# Patient Record
Sex: FEMALE | Race: WHITE
[De-identification: names, ages, dates, MRNs, and addresses within clinical notes are randomized per-mention and may not be internally consistent; named-entity substitution may affect disease eponyms.]

---

## 2017-03-08 ENCOUNTER — APPOINTMENT (OUTPATIENT)
Dept: HEMATOLOGY ONCOLOGY | Facility: CLINIC | Age: 57
End: 2017-03-08

## 2017-03-08 ENCOUNTER — OUTPATIENT (OUTPATIENT)
Dept: OUTPATIENT SERVICES | Facility: HOSPITAL | Age: 57
LOS: 1 days | Discharge: HOME | End: 2017-03-08

## 2017-03-08 VITALS
HEIGHT: 61 IN | RESPIRATION RATE: 15 BRPM | TEMPERATURE: 97.7 F | HEART RATE: 108 BPM | SYSTOLIC BLOOD PRESSURE: 137 MMHG | WEIGHT: 185 LBS | DIASTOLIC BLOOD PRESSURE: 83 MMHG | BODY MASS INDEX: 34.93 KG/M2

## 2017-03-08 DIAGNOSIS — C50.919 MALIGNANT NEOPLASM OF UNSPECIFIED SITE OF UNSPECIFIED FEMALE BREAST: ICD-10-CM

## 2017-03-08 DIAGNOSIS — N23 UNSPECIFIED RENAL COLIC: ICD-10-CM

## 2017-03-08 DIAGNOSIS — M19.90 UNSPECIFIED OSTEOARTHRITIS, UNSPECIFIED SITE: ICD-10-CM

## 2017-03-20 LAB
ALBUMIN SERPL-MCNC: 4.6 G/DL
ALBUMIN/GLOB SERPL: 1.77
ALP SERPL-CCNC: 84 IU/L
ALT SERPL-CCNC: 59 IU/L
ANION GAP SERPL CALC-SCNC: 12 MEQ/L
AST SERPL-CCNC: 28 IU/L
BASOPHILS # BLD: 0.05 TH/MM3
BASOPHILS NFR BLD: 0.5 %
BILIRUB SERPL-MCNC: 0.6 MG/DL
BUN SERPL-MCNC: 16 MG/DL
BUN/CREAT SERPL: 16.2 %
CALCIUM SERPL-MCNC: 10.2 MG/DL
CANCER AG15-3 SERPL-ACNC: 13 U/ML
CANCER AG27-29 SERPL-ACNC: 18.4 U/ML
CEA SERPL-MCNC: 0.8 NG/ML
CHLORIDE SERPL-SCNC: 102 MEQ/L
CO2 SERPL-SCNC: 26 MEQ/L
CREAT SERPL-MCNC: 0.99 MG/DL
EOSINOPHIL # BLD: 0.16 TH/MM3
EOSINOPHIL NFR BLD: 1.7 %
ERYTHROCYTE [DISTWIDTH] IN BLOOD BY AUTOMATED COUNT: 13 %
GFR SERPL CREATININE-BSD FRML MDRD: 58
GLUCOSE SERPL-MCNC: 77 MG/DL
GRANULOCYTES # BLD: 5.05 TH/MM3
GRANULOCYTES NFR BLD: 55.1 %
HCT VFR BLD AUTO: 40.7 %
HGB BLD-MCNC: 13.9 G/DL
IMM GRANULOCYTES # BLD: 0.02 TH/MM3
IMM GRANULOCYTES NFR BLD: 0.2 %
LYMPHOCYTES # BLD: 3.2 TH/MM3
LYMPHOCYTES NFR BLD: 34.9 %
MCH RBC QN AUTO: 30.3 PG
MCHC RBC AUTO-ENTMCNC: 34.2 G/DL
MCV RBC AUTO: 88.9 FL
MONOCYTES # BLD: 0.7 TH/MM3
MONOCYTES NFR BLD: 7.6 %
PLATELET # BLD: 312 TH/MM3
PMV BLD AUTO: 9.3 FL
POTASSIUM SERPL-SCNC: 4.4 MMOL/L
PROT SERPL-MCNC: 7.2 G/DL
RBC # BLD AUTO: 4.58 MIL/MM3
SODIUM SERPL-SCNC: 140 MEQ/L
WBC # BLD: 9.18 TH/MM3

## 2017-04-10 ENCOUNTER — APPOINTMENT (OUTPATIENT)
Dept: UROLOGY | Facility: CLINIC | Age: 57
End: 2017-04-10

## 2017-04-11 ENCOUNTER — OUTPATIENT (OUTPATIENT)
Dept: OUTPATIENT SERVICES | Facility: HOSPITAL | Age: 57
LOS: 1 days | Discharge: HOME | End: 2017-04-11

## 2017-05-16 ENCOUNTER — APPOINTMENT (OUTPATIENT)
Dept: BREAST CENTER | Facility: CLINIC | Age: 57
End: 2017-05-16

## 2017-05-16 VITALS
HEIGHT: 61 IN | DIASTOLIC BLOOD PRESSURE: 82 MMHG | SYSTOLIC BLOOD PRESSURE: 124 MMHG | WEIGHT: 185 LBS | BODY MASS INDEX: 34.93 KG/M2

## 2017-05-16 DIAGNOSIS — Z80.42 FAMILY HISTORY OF MALIGNANT NEOPLASM OF PROSTATE: ICD-10-CM

## 2017-05-16 DIAGNOSIS — Z87.891 PERSONAL HISTORY OF NICOTINE DEPENDENCE: ICD-10-CM

## 2017-05-16 DIAGNOSIS — Z80.1 FAMILY HISTORY OF MALIGNANT NEOPLASM OF TRACHEA, BRONCHUS AND LUNG: ICD-10-CM

## 2017-05-18 ENCOUNTER — RX RENEWAL (OUTPATIENT)
Age: 57
End: 2017-05-18

## 2017-06-27 DIAGNOSIS — C50.919 MALIGNANT NEOPLASM OF UNSPECIFIED SITE OF UNSPECIFIED FEMALE BREAST: ICD-10-CM

## 2017-07-31 ENCOUNTER — OUTPATIENT (OUTPATIENT)
Dept: OUTPATIENT SERVICES | Facility: HOSPITAL | Age: 57
LOS: 1 days | Discharge: HOME | End: 2017-07-31

## 2017-07-31 DIAGNOSIS — Z12.31 ENCOUNTER FOR SCREENING MAMMOGRAM FOR MALIGNANT NEOPLASM OF BREAST: ICD-10-CM

## 2017-07-31 DIAGNOSIS — C50.919 MALIGNANT NEOPLASM OF UNSPECIFIED SITE OF UNSPECIFIED FEMALE BREAST: ICD-10-CM

## 2017-07-31 DIAGNOSIS — N23 UNSPECIFIED RENAL COLIC: ICD-10-CM

## 2017-07-31 DIAGNOSIS — M19.90 UNSPECIFIED OSTEOARTHRITIS, UNSPECIFIED SITE: ICD-10-CM

## 2017-08-07 ENCOUNTER — TRANSCRIPTION ENCOUNTER (OUTPATIENT)
Age: 57
End: 2017-08-07

## 2017-09-13 ENCOUNTER — APPOINTMENT (OUTPATIENT)
Dept: HEMATOLOGY ONCOLOGY | Facility: CLINIC | Age: 57
End: 2017-09-13

## 2017-09-13 ENCOUNTER — OUTPATIENT (OUTPATIENT)
Dept: OUTPATIENT SERVICES | Facility: HOSPITAL | Age: 57
LOS: 1 days | Discharge: HOME | End: 2017-09-13

## 2017-09-13 VITALS
SYSTOLIC BLOOD PRESSURE: 128 MMHG | TEMPERATURE: 97.3 F | HEIGHT: 61 IN | RESPIRATION RATE: 14 BRPM | WEIGHT: 185 LBS | HEART RATE: 98 BPM | DIASTOLIC BLOOD PRESSURE: 84 MMHG | BODY MASS INDEX: 34.93 KG/M2

## 2017-09-14 DIAGNOSIS — C50.811 MALIGNANT NEOPLASM OF OVERLAPPING SITES OF RIGHT FEMALE BREAST: ICD-10-CM

## 2017-10-05 ENCOUNTER — APPOINTMENT (OUTPATIENT)
Dept: BREAST CENTER | Facility: CLINIC | Age: 57
End: 2017-10-05
Payer: COMMERCIAL

## 2017-10-05 VITALS
SYSTOLIC BLOOD PRESSURE: 122 MMHG | BODY MASS INDEX: 34.93 KG/M2 | HEART RATE: 95 BPM | HEIGHT: 61 IN | DIASTOLIC BLOOD PRESSURE: 82 MMHG | OXYGEN SATURATION: 96 % | WEIGHT: 185 LBS

## 2017-10-05 PROCEDURE — 99213 OFFICE O/P EST LOW 20 MIN: CPT

## 2017-12-07 ENCOUNTER — OUTPATIENT (OUTPATIENT)
Dept: OUTPATIENT SERVICES | Facility: HOSPITAL | Age: 57
LOS: 1 days | Discharge: HOME | End: 2017-12-07

## 2017-12-07 DIAGNOSIS — C50.919 MALIGNANT NEOPLASM OF UNSPECIFIED SITE OF UNSPECIFIED FEMALE BREAST: ICD-10-CM

## 2017-12-07 DIAGNOSIS — N23 UNSPECIFIED RENAL COLIC: ICD-10-CM

## 2017-12-07 DIAGNOSIS — N20.0 CALCULUS OF KIDNEY: ICD-10-CM

## 2017-12-07 DIAGNOSIS — M19.90 UNSPECIFIED OSTEOARTHRITIS, UNSPECIFIED SITE: ICD-10-CM

## 2018-01-02 ENCOUNTER — APPOINTMENT (OUTPATIENT)
Dept: UROLOGY | Facility: CLINIC | Age: 58
End: 2018-01-02
Payer: COMMERCIAL

## 2018-01-02 VITALS
WEIGHT: 185 LBS | BODY MASS INDEX: 34.93 KG/M2 | HEIGHT: 61 IN | SYSTOLIC BLOOD PRESSURE: 149 MMHG | DIASTOLIC BLOOD PRESSURE: 89 MMHG | HEART RATE: 79 BPM

## 2018-01-02 PROCEDURE — 99214 OFFICE O/P EST MOD 30 MIN: CPT

## 2018-01-11 ENCOUNTER — OUTPATIENT (OUTPATIENT)
Dept: OUTPATIENT SERVICES | Facility: HOSPITAL | Age: 58
LOS: 1 days | Discharge: HOME | End: 2018-01-11

## 2018-01-11 DIAGNOSIS — N23 UNSPECIFIED RENAL COLIC: ICD-10-CM

## 2018-01-11 DIAGNOSIS — C50.919 MALIGNANT NEOPLASM OF UNSPECIFIED SITE OF UNSPECIFIED FEMALE BREAST: ICD-10-CM

## 2018-01-11 DIAGNOSIS — Z01.818 ENCOUNTER FOR OTHER PREPROCEDURAL EXAMINATION: ICD-10-CM

## 2018-01-11 DIAGNOSIS — N20.2 CALCULUS OF KIDNEY WITH CALCULUS OF URETER: ICD-10-CM

## 2018-01-11 DIAGNOSIS — M19.90 UNSPECIFIED OSTEOARTHRITIS, UNSPECIFIED SITE: ICD-10-CM

## 2018-01-17 ENCOUNTER — OUTPATIENT (OUTPATIENT)
Dept: OUTPATIENT SERVICES | Facility: HOSPITAL | Age: 58
LOS: 1 days | Discharge: HOME | End: 2018-01-17

## 2018-01-17 ENCOUNTER — APPOINTMENT (OUTPATIENT)
Dept: UROLOGY | Facility: HOSPITAL | Age: 58
End: 2018-01-17
Payer: COMMERCIAL

## 2018-01-17 DIAGNOSIS — C50.919 MALIGNANT NEOPLASM OF UNSPECIFIED SITE OF UNSPECIFIED FEMALE BREAST: ICD-10-CM

## 2018-01-17 DIAGNOSIS — N23 UNSPECIFIED RENAL COLIC: ICD-10-CM

## 2018-01-17 DIAGNOSIS — M19.90 UNSPECIFIED OSTEOARTHRITIS, UNSPECIFIED SITE: ICD-10-CM

## 2018-01-17 PROCEDURE — 50590 FRAGMENTING OF KIDNEY STONE: CPT

## 2018-01-25 DIAGNOSIS — N20.0 CALCULUS OF KIDNEY: ICD-10-CM

## 2018-02-07 ENCOUNTER — OUTPATIENT (OUTPATIENT)
Dept: OUTPATIENT SERVICES | Facility: HOSPITAL | Age: 58
LOS: 1 days | Discharge: HOME | End: 2018-02-07

## 2018-02-07 DIAGNOSIS — N20.1 CALCULUS OF URETER: ICD-10-CM

## 2018-02-13 ENCOUNTER — LABORATORY RESULT (OUTPATIENT)
Age: 58
End: 2018-02-13

## 2018-02-13 ENCOUNTER — APPOINTMENT (OUTPATIENT)
Dept: UROLOGY | Facility: CLINIC | Age: 58
End: 2018-02-13
Payer: COMMERCIAL

## 2018-02-13 VITALS
WEIGHT: 185 LBS | SYSTOLIC BLOOD PRESSURE: 147 MMHG | BODY MASS INDEX: 34.93 KG/M2 | HEART RATE: 86 BPM | HEIGHT: 61 IN | DIASTOLIC BLOOD PRESSURE: 87 MMHG

## 2018-02-13 DIAGNOSIS — N20.0 CALCULUS OF KIDNEY: ICD-10-CM

## 2018-02-13 PROCEDURE — 99024 POSTOP FOLLOW-UP VISIT: CPT

## 2018-02-14 ENCOUNTER — RX RENEWAL (OUTPATIENT)
Age: 58
End: 2018-02-14

## 2018-03-29 ENCOUNTER — APPOINTMENT (OUTPATIENT)
Dept: HEMATOLOGY ONCOLOGY | Facility: CLINIC | Age: 58
End: 2018-03-29

## 2018-03-29 ENCOUNTER — LABORATORY RESULT (OUTPATIENT)
Age: 58
End: 2018-03-29

## 2018-03-29 VITALS
HEART RATE: 78 BPM | WEIGHT: 185 LBS | RESPIRATION RATE: 16 BRPM | SYSTOLIC BLOOD PRESSURE: 130 MMHG | DIASTOLIC BLOOD PRESSURE: 84 MMHG | BODY MASS INDEX: 34.93 KG/M2 | TEMPERATURE: 98 F | HEIGHT: 61 IN

## 2018-04-26 ENCOUNTER — APPOINTMENT (OUTPATIENT)
Dept: BREAST CENTER | Facility: CLINIC | Age: 58
End: 2018-04-26

## 2018-05-01 ENCOUNTER — OUTPATIENT (OUTPATIENT)
Dept: OUTPATIENT SERVICES | Facility: HOSPITAL | Age: 58
LOS: 1 days | Discharge: HOME | End: 2018-05-01

## 2018-05-01 DIAGNOSIS — C50.411 MALIGNANT NEOPLASM OF UPPER-OUTER QUADRANT OF RIGHT FEMALE BREAST: ICD-10-CM

## 2018-05-29 ENCOUNTER — APPOINTMENT (OUTPATIENT)
Dept: BREAST CENTER | Facility: CLINIC | Age: 58
End: 2018-05-29
Payer: COMMERCIAL

## 2018-05-29 VITALS
DIASTOLIC BLOOD PRESSURE: 82 MMHG | HEIGHT: 61 IN | SYSTOLIC BLOOD PRESSURE: 126 MMHG | BODY MASS INDEX: 34.93 KG/M2 | WEIGHT: 185 LBS | HEART RATE: 104 BPM | OXYGEN SATURATION: 97 %

## 2018-05-29 PROCEDURE — 99213 OFFICE O/P EST LOW 20 MIN: CPT

## 2018-06-27 ENCOUNTER — OUTPATIENT (OUTPATIENT)
Dept: OUTPATIENT SERVICES | Facility: HOSPITAL | Age: 58
LOS: 1 days | Discharge: HOME | End: 2018-06-27

## 2018-06-27 ENCOUNTER — APPOINTMENT (OUTPATIENT)
Dept: HEMATOLOGY ONCOLOGY | Facility: CLINIC | Age: 58
End: 2018-06-27

## 2018-06-27 VITALS
TEMPERATURE: 97 F | HEIGHT: 61 IN | WEIGHT: 185 LBS | RESPIRATION RATE: 16 BRPM | SYSTOLIC BLOOD PRESSURE: 144 MMHG | DIASTOLIC BLOOD PRESSURE: 86 MMHG | HEART RATE: 98 BPM | BODY MASS INDEX: 34.93 KG/M2

## 2018-06-27 DIAGNOSIS — C50.811 MALIGNANT NEOPLASM OF OVERLAPPING SITES OF RIGHT FEMALE BREAST: ICD-10-CM

## 2018-06-30 LAB
ALBUMIN SERPL ELPH-MCNC: 4.7 G/DL
ALP BLD-CCNC: 92 U/L
ALT SERPL-CCNC: 94 U/L
ANION GAP SERPL CALC-SCNC: 17 MMOL/L
AST SERPL-CCNC: 37 U/L
BILIRUB SERPL-MCNC: 0.4 MG/DL
BUN SERPL-MCNC: 20 MG/DL
CALCIUM SERPL-MCNC: 10.2 MG/DL
CANCER AG15-3 SERPL-ACNC: 12.7 U/ML
CEA SERPL-MCNC: 0.9 NG/ML
CHLORIDE SERPL-SCNC: 103 MMOL/L
CO2 SERPL-SCNC: 23 MMOL/L
CREAT SERPL-MCNC: 0.9 MG/DL
GLUCOSE SERPL-MCNC: 69 MG/DL
HCT VFR BLD CALC: 43.6 %
HGB BLD-MCNC: 14.6 G/DL
MCHC RBC-ENTMCNC: 29.7 PG
MCHC RBC-ENTMCNC: 33.5 G/DL
MCV RBC AUTO: 88.8 FL
PLATELET # BLD AUTO: 275 K/UL
PMV BLD: 9.4 FL
POTASSIUM SERPL-SCNC: 4.3 MMOL/L
PROT SERPL-MCNC: 7.2 G/DL
RBC # BLD: 4.91 M/UL
RBC # FLD: 12.3 %
SODIUM SERPL-SCNC: 143 MMOL/L
WBC # FLD AUTO: 9.46 K/UL

## 2018-07-31 ENCOUNTER — FORM ENCOUNTER (OUTPATIENT)
Age: 58
End: 2018-07-31

## 2018-08-01 ENCOUNTER — OUTPATIENT (OUTPATIENT)
Dept: OUTPATIENT SERVICES | Facility: HOSPITAL | Age: 58
LOS: 1 days | Discharge: HOME | End: 2018-08-01

## 2018-08-01 DIAGNOSIS — Z12.31 ENCOUNTER FOR SCREENING MAMMOGRAM FOR MALIGNANT NEOPLASM OF BREAST: ICD-10-CM

## 2018-08-02 DIAGNOSIS — Z78.0 ASYMPTOMATIC MENOPAUSAL STATE: ICD-10-CM

## 2018-08-02 DIAGNOSIS — Z13.820 ENCOUNTER FOR SCREENING FOR OSTEOPOROSIS: ICD-10-CM

## 2018-08-02 DIAGNOSIS — M89.9 DISORDER OF BONE, UNSPECIFIED: ICD-10-CM

## 2018-08-07 ENCOUNTER — APPOINTMENT (OUTPATIENT)
Dept: BREAST CENTER | Facility: CLINIC | Age: 58
End: 2018-08-07
Payer: COMMERCIAL

## 2018-08-07 VITALS
OXYGEN SATURATION: 96 % | HEART RATE: 94 BPM | WEIGHT: 185 LBS | BODY MASS INDEX: 34.93 KG/M2 | HEIGHT: 61 IN | SYSTOLIC BLOOD PRESSURE: 122 MMHG | DIASTOLIC BLOOD PRESSURE: 80 MMHG

## 2018-08-07 PROCEDURE — 99213 OFFICE O/P EST LOW 20 MIN: CPT

## 2018-08-14 ENCOUNTER — APPOINTMENT (OUTPATIENT)
Dept: UROLOGY | Facility: CLINIC | Age: 58
End: 2018-08-14

## 2018-08-31 ENCOUNTER — RX RENEWAL (OUTPATIENT)
Age: 58
End: 2018-08-31

## 2018-08-31 RX ORDER — ANASTROZOLE TABLETS 1 MG/1
1 TABLET ORAL DAILY
Qty: 90 | Refills: 2 | Status: ACTIVE | COMMUNITY
Start: 2017-05-18 | End: 1900-01-01

## 2018-09-26 ENCOUNTER — APPOINTMENT (OUTPATIENT)
Dept: HEMATOLOGY ONCOLOGY | Facility: CLINIC | Age: 58
End: 2018-09-26

## 2018-09-26 ENCOUNTER — OUTPATIENT (OUTPATIENT)
Dept: OUTPATIENT SERVICES | Facility: HOSPITAL | Age: 58
LOS: 1 days | Discharge: HOME | End: 2018-09-26

## 2018-09-26 VITALS
HEART RATE: 98 BPM | DIASTOLIC BLOOD PRESSURE: 77 MMHG | BODY MASS INDEX: 33.99 KG/M2 | HEIGHT: 61 IN | SYSTOLIC BLOOD PRESSURE: 130 MMHG | WEIGHT: 180 LBS | RESPIRATION RATE: 16 BRPM | TEMPERATURE: 97.6 F

## 2018-09-27 DIAGNOSIS — Z79.811 LONG TERM (CURRENT) USE OF AROMATASE INHIBITORS: ICD-10-CM

## 2018-09-27 DIAGNOSIS — C50.811 MALIGNANT NEOPLASM OF OVERLAPPING SITES OF RIGHT FEMALE BREAST: ICD-10-CM

## 2019-03-27 ENCOUNTER — LABORATORY RESULT (OUTPATIENT)
Age: 59
End: 2019-03-27

## 2019-03-27 ENCOUNTER — OUTPATIENT (OUTPATIENT)
Dept: OUTPATIENT SERVICES | Facility: HOSPITAL | Age: 59
LOS: 1 days | Discharge: HOME | End: 2019-03-27

## 2019-03-27 ENCOUNTER — APPOINTMENT (OUTPATIENT)
Dept: HEMATOLOGY ONCOLOGY | Facility: CLINIC | Age: 59
End: 2019-03-27

## 2019-03-27 VITALS
RESPIRATION RATE: 14 BRPM | SYSTOLIC BLOOD PRESSURE: 118 MMHG | BODY MASS INDEX: 34.17 KG/M2 | HEART RATE: 102 BPM | WEIGHT: 181 LBS | DIASTOLIC BLOOD PRESSURE: 92 MMHG | HEIGHT: 61 IN | TEMPERATURE: 98.6 F

## 2019-03-27 NOTE — HISTORY OF PRESENT ILLNESS
[de-identified] : A 57yearold female is here today for a followup visit.  She has a stage IIA pT2 N0M0  invasive ductal carcinoma of the right breast, ER/TX positive, HER2/ananth negative.  She had a right breast lumpectomy and axillary lymph node biopsy in 09/2012.  Her Oncotype recurrence score was 25.  She elected to have adjuvant chemotherapy.  She received Taxotere, cyclophosphamide for four cycles.  After that, she had received a course of adjuvant breast radiotherapy and finished in 03/2013.  She has been taking adjuvant endocrine therapy with anastrozole since 04/2013.  She developed muscular aching and was enrolled in SWOG phase III trial evaluating Duloxetine in women who developed musculoskeletal aching due to an aromatase inhibitor.  She took the medicine for three months and completed study.  \par \par She had last bone density done on 07/29/2016, which showed osteopenia in the lumbar spine.  She is taking calcium and vitamin D. She had Mammogram done on 07/31/2017  It showed stable calcified mass in the left breast for over 2 year.  She scheduled for b/l breast MRI 4/11/2018.  [de-identified] : Today,  she reports feeling well.  She is working full time as a .  She had her bone density done on 07/29/2016, which showed osteopenia in the lumbar spine.  She is taking calcium and vitamin D.\par \par On 5/2/18  she had B/l breast MRI with no suspicious finding .  She scheduled for b/l  breast mammogram on 8/1/18 and will be due for Bone density in 8/1/18 . \par \par The BCI test was done on her previous surgical specimen, which showed high risk of cancer recurrent from 5 to 10 at 6.1%. The study shows low likelihood benefit from extended endocrine therapy.\par  \par on 9/26/18 patient came for scheduled follow up visit, she reported feeling well. She still working as a full time teacher. We  re communicate the extend of the endocrine therapy. also we reviewed Bone density result reveal osteopenia in the Spin region. Patient reported she is more comfortable to continue Endocrine therapy for 2 more year. \par Bone Density: from 8/1/18 \par ----------------------------------------------------------------- \par Region BMD T-score Z-score Classification \par ----------------------------------------------------------------- \par AP Spine (L1-L4) 0.838 -1.9 -0.6 Osteopenia \par Femoral Neck (Left) 0.831 -0.2 1.0 Normal \par Total Hip (Left) 0.928 -0.1 0.7 Normal \par \par Patient had Mammogram on 8/1/18 \par -----------------------------------------------------------------\par No suspicious masses, calcifications or other abnormalities are seen. \par \par When compared to prior studies there is no significant change. \par \par IMPRESSION: \par There is no evidence of malignancy\par \par 3/27/19\par Patient is here today for follow up visit. She takes Anastrozole since 4/2013.  Although she c/o joint pains and weight gain, she prefers to complete 7 years of Anastrozole.  Her latest mammogram on 8/2018 showed no malignancy. Next MRI breasts due on 4/2019. Labs reviewed. Slightly elevated ALT =94.1, Ca=10.2.  Bone density 8/2018 showed Osteopenia.\par \par The BCI test was done on her previous surgical specimen, which showed high risk of cancer recurrent from 5 to 10 at 6.1%. The study shows low likelihood benefit from extended endocrine therapy.

## 2019-03-27 NOTE — PHYSICAL EXAM
[Fully active, able to carry on all pre-disease performance without restriction] : Status 0 - Fully active, able to carry on all pre-disease performance without restriction [Normal] : affect appropriate [de-identified] : Status post right breast Lumpectomy. There is no palpable abnormality in either breast and axilla.

## 2019-03-27 NOTE — ASSESSMENT
[FreeTextEntry1] : Stage IIA ER/ND positive, HER2/ananth negative invasive ductal carcinoma of the right breast, status post lumpectomy, sentinel lymph node biopsy, adjuvant chemotherapy, adjuvant breast therapy, radiotherapy, currently on adjuvant endocrine therapy.  There is no clinical evidence of recurrent disease.\par Oncotype RS 26.\par \par PLAN: \par -- Continue Anastrozole daily till 4/2020 (4 years total).  E-refilled today.\par -- Encourage more physical activities.\par -- MRI breasts appt 4/2019.\par -- Mammogram appt due 8/2019.  \par --Blood work ordered today:  CBC, CMP, CA 15-3, CEA.\par -- Followup with PCP for health maintenance.\par -- Followup in 3  months with Dr. Delaney.\par \par Case was seen and discussed with Dr. Delaney who agreed with the assessment and plan.\par

## 2019-03-27 NOTE — CONSULT LETTER
[Dear  ___] : Dear  [unfilled], [Courtesy Letter:] : I had the pleasure of seeing your patient, [unfilled], in my office today. [Please see my note below.] : Please see my note below. [Sincerely,] : Sincerely, [FreeTextEntry3] : Jade Delaney MD

## 2019-03-28 ENCOUNTER — TRANSCRIPTION ENCOUNTER (OUTPATIENT)
Age: 59
End: 2019-03-28

## 2019-03-29 DIAGNOSIS — Z79.811 LONG TERM (CURRENT) USE OF AROMATASE INHIBITORS: ICD-10-CM

## 2019-03-29 DIAGNOSIS — C50.811 MALIGNANT NEOPLASM OF OVERLAPPING SITES OF RIGHT FEMALE BREAST: ICD-10-CM

## 2019-04-11 LAB
ALBUMIN SERPL ELPH-MCNC: 4.8 G/DL
ALP BLD-CCNC: 97 U/L
ALT SERPL-CCNC: 54 U/L
ANION GAP SERPL CALC-SCNC: 17 MMOL/L
AST SERPL-CCNC: 26 U/L
BILIRUB SERPL-MCNC: 0.6 MG/DL
BUN SERPL-MCNC: 21 MG/DL
CALCIUM SERPL-MCNC: 10.5 MG/DL
CANCER AG15-3 SERPL-ACNC: 11.2 U/ML
CEA SERPL-MCNC: 0.6 NG/ML
CHLORIDE SERPL-SCNC: 102 MMOL/L
CO2 SERPL-SCNC: 23 MMOL/L
CREAT SERPL-MCNC: 1 MG/DL
GLUCOSE SERPL-MCNC: 88 MG/DL
HCT VFR BLD CALC: 40.7 %
HGB BLD-MCNC: 13.9 G/DL
MCHC RBC-ENTMCNC: 30.3 PG
MCHC RBC-ENTMCNC: 34.2 G/DL
MCV RBC AUTO: 88.7 FL
PLATELET # BLD AUTO: 264 K/UL
PMV BLD: 9.3 FL
POTASSIUM SERPL-SCNC: 4.2 MMOL/L
PROT SERPL-MCNC: 7.4 G/DL
RBC # BLD: 4.59 M/UL
RBC # FLD: 12.6 %
SODIUM SERPL-SCNC: 142 MMOL/L
WBC # FLD AUTO: 10.1 K/UL

## 2019-05-14 ENCOUNTER — FORM ENCOUNTER (OUTPATIENT)
Age: 59
End: 2019-05-14

## 2019-05-15 ENCOUNTER — OUTPATIENT (OUTPATIENT)
Dept: OUTPATIENT SERVICES | Facility: HOSPITAL | Age: 59
LOS: 1 days | Discharge: HOME | End: 2019-05-15
Payer: COMMERCIAL

## 2019-05-15 DIAGNOSIS — C50.411 MALIGNANT NEOPLASM OF UPPER-OUTER QUADRANT OF RIGHT FEMALE BREAST: ICD-10-CM

## 2019-05-15 PROCEDURE — 77049 MRI BREAST C-+ W/CAD BI: CPT | Mod: 26

## 2019-06-06 ENCOUNTER — APPOINTMENT (OUTPATIENT)
Dept: BREAST CENTER | Facility: CLINIC | Age: 59
End: 2019-06-06
Payer: COMMERCIAL

## 2019-06-06 VITALS
WEIGHT: 181 LBS | DIASTOLIC BLOOD PRESSURE: 78 MMHG | SYSTOLIC BLOOD PRESSURE: 112 MMHG | TEMPERATURE: 98.2 F | BODY MASS INDEX: 34.17 KG/M2 | HEIGHT: 61 IN

## 2019-06-06 PROCEDURE — 99213 OFFICE O/P EST LOW 20 MIN: CPT

## 2019-06-07 NOTE — REVIEW OF SYSTEMS
[Negative] : Constitutional [Breast Lump] : no breast lump [Breast Pain] : no breast pain [Nipple Discharge] : no nipple discharge [Nipple Inverted] : no inversion of the nipple

## 2019-06-07 NOTE — ASSESSMENT
[FreeTextEntry1] : MAI is a dana 58 year old patient who presented today in follow up for a history of right breast cancer. \par She has been doing well with no complaints. \par Imaging was done recently which revealed no MR evidence of malignancy; stable architectural distortion in the right breast upper-outer quadrant at prior lumpectomy site consistent with postsurgical change, as detailed above.\par Physical exam was unrevealing today.\par \par Imaging with bilateral screening mammogram will be due in August 2019, and that will be scheduled today. \par Some thought to further spreading out MRI / mammo imaging will be given for 2020.\par All questions answered. She knows to call with any concerns.\par She will return for follow-up and clinical breast exam in approximately six months.\par She will continue follow-up with medical oncology as well.\par \par MAI URIARTE has been enrolled in the breast cancer surgical survivorship care program.\par A copy of her survivorship care plan has been provided to her as well.

## 2019-06-07 NOTE — PHYSICAL EXAM
[Normocephalic] : normocephalic [Atraumatic] : atraumatic [No dominant masses] : no dominant masses in right breast  [No dominant masses] : no dominant masses left breast [No Nipple Discharge] : no left nipple discharge [No Rashes] : no rashes [No Ulceration] : no ulceration [Breast Nipple Inversion] : nipples not inverted [Breast Nipple Retraction] : nipples not retracted [de-identified] : No axillary lymphadenopathy appreciated. [de-identified] : well healed surgical scars. \par some palpable scar tissue.  [de-identified] : No axillary lymphadenopathy appreciated.

## 2019-06-07 NOTE — DATA REVIEWED
[FreeTextEntry1] : B/L Breast MRI - 05/15/19:\par FINDINGS: \par \par Amount of fibroglandular tissue: Scattered fibroglandular tissue \par \par Background parenchymal enhancement: Minimal, Symmetric \par \par RIGHT BREAST: \par No enhancing mass or suspicious area of enhancement is identified. \par \par Stable architectural distortion in the upper outer quadrant at prior \par lumpectomy site consistent with postsurgical change. Multiple biopsy clips \par from prior benign biopsies are again noted. \par \par Nipple and skin appear normal. \par \par No axillary adenopathy. \par \par LEFT BREAST: \par Multiple stable masses are again seen which have been unchanged for over 2 \par years and therefore benign. \par \par Otherwise no suspicious enhancing mass, architectural distortion, or \par suspicious area of enhancement is identified. \par \par Nipple and skin appear normal. \par \par No axillary adenopathy. \par \par OTHER: \par Imaged portions of the chest and abdomen demonstrate no appreciable \par abnormality. \par \par \par IMPRESSION: \par \par No MR evidence of malignancy. \par \par Stable architectural distortion in the right breast upper-outer quadrant at \par prior lumpectomy site consistent with postsurgical change. \par \par Recommendation: Unless otherwise indicated by clinical findings, annual \par screening mammography recommended. \par \par BI-RADS Category 2: Benign

## 2019-06-07 NOTE — PAST MEDICAL HISTORY
[Menarche Age ____] : age at menarche was [unfilled] [Total Preg ___] : G[unfilled] [Live Births ___] : P[unfilled]  [Age At Live Birth ___] : Age at live birth: [unfilled] [History of Hormone Replacement Treatment] : has no history of hormone replacement treatment [FreeTextEntry5] : none [FreeTextEntry6] : none [FreeTextEntry8] : none [FreeTextEntry7] : none

## 2019-08-19 ENCOUNTER — FORM ENCOUNTER (OUTPATIENT)
Age: 59
End: 2019-08-19

## 2019-08-20 ENCOUNTER — OUTPATIENT (OUTPATIENT)
Dept: OUTPATIENT SERVICES | Facility: HOSPITAL | Age: 59
LOS: 1 days | Discharge: HOME | End: 2019-08-20
Payer: COMMERCIAL

## 2019-08-20 DIAGNOSIS — Z85.3 PERSONAL HISTORY OF MALIGNANT NEOPLASM OF BREAST: ICD-10-CM

## 2019-08-20 DIAGNOSIS — Z12.31 ENCOUNTER FOR SCREENING MAMMOGRAM FOR MALIGNANT NEOPLASM OF BREAST: ICD-10-CM

## 2019-08-20 PROCEDURE — 77063 BREAST TOMOSYNTHESIS BI: CPT | Mod: 26

## 2019-08-20 PROCEDURE — 77067 SCR MAMMO BI INCL CAD: CPT | Mod: 26

## 2019-09-25 ENCOUNTER — LABORATORY RESULT (OUTPATIENT)
Age: 59
End: 2019-09-25

## 2019-09-25 ENCOUNTER — OUTPATIENT (OUTPATIENT)
Dept: OUTPATIENT SERVICES | Facility: HOSPITAL | Age: 59
LOS: 1 days | Discharge: HOME | End: 2019-09-25

## 2019-09-25 ENCOUNTER — APPOINTMENT (OUTPATIENT)
Dept: HEMATOLOGY ONCOLOGY | Facility: CLINIC | Age: 59
End: 2019-09-25
Payer: COMMERCIAL

## 2019-09-25 VITALS
WEIGHT: 178 LBS | SYSTOLIC BLOOD PRESSURE: 136 MMHG | TEMPERATURE: 97.3 F | BODY MASS INDEX: 33.63 KG/M2 | DIASTOLIC BLOOD PRESSURE: 60 MMHG | HEART RATE: 84 BPM

## 2019-09-25 PROCEDURE — 99214 OFFICE O/P EST MOD 30 MIN: CPT

## 2019-10-06 LAB
ALBUMIN SERPL ELPH-MCNC: 4.9 G/DL
ALP BLD-CCNC: 91 U/L
ALT SERPL-CCNC: 48 U/L
ANION GAP SERPL CALC-SCNC: 14 MMOL/L
AST SERPL-CCNC: 26 U/L
BILIRUB SERPL-MCNC: 0.5 MG/DL
BUN SERPL-MCNC: 17 MG/DL
CALCIUM SERPL-MCNC: 9.9 MG/DL
CANCER AG15-3 SERPL-ACNC: 10.9 U/ML
CEA SERPL-MCNC: 0.8 NG/ML
CHLORIDE SERPL-SCNC: 101 MMOL/L
CO2 SERPL-SCNC: 27 MMOL/L
CREAT SERPL-MCNC: 1 MG/DL
GLUCOSE SERPL-MCNC: 79 MG/DL
HCT VFR BLD CALC: 42.8 %
HGB BLD-MCNC: 13.9 G/DL
MCHC RBC-ENTMCNC: 29.4 PG
MCHC RBC-ENTMCNC: 32.5 G/DL
MCV RBC AUTO: 90.7 FL
PLATELET # BLD AUTO: 269 K/UL
PMV BLD: 9.4 FL
POTASSIUM SERPL-SCNC: 4 MMOL/L
PROT SERPL-MCNC: 7.6 G/DL
RBC # BLD: 4.72 M/UL
RBC # FLD: 12.4 %
SODIUM SERPL-SCNC: 142 MMOL/L
WBC # FLD AUTO: 10.25 K/UL

## 2019-10-06 NOTE — HISTORY OF PRESENT ILLNESS
[de-identified] : A 57yearold female is here today for a followup visit.  She has a stage IIA pT2 N0M0  invasive ductal carcinoma of the right breast, ER/SC positive, HER2/ananth negative.  She had a right breast lumpectomy and axillary lymph node biopsy in 09/2012.  Her Oncotype recurrence score was 25.  She elected to have adjuvant chemotherapy.  She received Taxotere, cyclophosphamide for four cycles.  After that, she had received a course of adjuvant breast radiotherapy and finished in 03/2013.  She has been taking adjuvant endocrine therapy with anastrozole since 04/2013.  She developed muscular aching and was enrolled in SWOG phase III trial evaluating Duloxetine in women who developed musculoskeletal aching due to an aromatase inhibitor.  She took the medicine for three months and completed study.  \par \par She had last bone density done on 07/29/2016, which showed osteopenia in the lumbar spine.  She is taking calcium and vitamin D. She had Mammogram done on 07/31/2017  It showed stable calcified mass in the left breast for over 2 year.  She scheduled for b/l breast MRI 4/11/2018.  [de-identified] : Today,  she reports feeling well.  She is working full time as a .  She had her bone density done on 07/29/2016, which showed osteopenia in the lumbar spine.  She is taking calcium and vitamin D.\par \par On 5/2/18  she had B/l breast MRI with no suspicious finding .  She scheduled for b/l  breast mammogram on 8/1/18 and will be due for Bone density in 8/1/18 . \par \par The BCI test was done on her previous surgical specimen, which showed high risk of cancer recurrent from 5 to 10 at 6.1%. The study shows low likelihood benefit from extended endocrine therapy.\par  \par on 9/26/18 patient came for scheduled follow up visit, she reported feeling well. She still working as a full time teacher. We  re communicate the extend of the endocrine therapy. also we reviewed Bone density result reveal osteopenia in the Spin region. Patient reported she is more comfortable to continue Endocrine therapy for 2 more year. \par Bone Density: from 8/1/18 \par ----------------------------------------------------------------- \par Region BMD T-score Z-score Classification \par ----------------------------------------------------------------- \par AP Spine (L1-L4) 0.838 -1.9 -0.6 Osteopenia \par Femoral Neck (Left) 0.831 -0.2 1.0 Normal \par Total Hip (Left) 0.928 -0.1 0.7 Normal \par \par Patient had Mammogram on 8/1/18 \par -----------------------------------------------------------------\par No suspicious masses, calcifications or other abnormalities are seen. \par \par When compared to prior studies there is no significant change. \par \par IMPRESSION: \par There is no evidence of malignancy\par \par 3/27/19\par Patient is here today for follow up visit. She takes Anastrozole since 4/2013.  Although she c/o joint pains and weight gain, she prefers to complete 7 years of Anastrozole.  Her latest mammogram on 8/2018 showed no malignancy. Next MRI breasts due on 4/2019. Labs reviewed. Slightly elevated ALT =94.1, Ca=10.2.  Bone density 8/2018 showed Osteopenia.\par \par The BCI test was done on her previous surgical specimen, which showed high risk of cancer recurrent from 5 to 10 at 6.1%. The study shows low likelihood benefit from extended endocrine therapy.\par \par 9/25/19\par Patient is here today for follow up visit. She has been taking Anastrozole since 4/2013. She has chronic arthralgia. She had b/l screening mammo in 8/2019. There was no suspicious finding. Bone density 8/2018 showed Osteopenia. She does not have breast related complains.\par

## 2019-10-06 NOTE — ASSESSMENT
[FreeTextEntry1] : Stage IIA ER/PA positive, HER2/ananth negative invasive ductal carcinoma of the right breast, status post lumpectomy, sentinel lymph node biopsy, adjuvant chemotherapy, adjuvant breast therapy, radiotherapy, currently on adjuvant endocrine therapy.  There is no clinical evidence of recurrent disease.\par Oncotype RS 26.\par \par PLAN: \par -- Continue Anastrozole daily, calium and vitamine D supplement.\par -- Encourage more physical activities.\par -- Annual MRI breasts and screening mammo alternatively.  \par -- Blood work ordered today:  CBC, CMP, CA 15-3, CEA.\par -- Followup with PCP for health maintenance.\par -- Followup in 3  months with Dr. Delaney.\par \par Case was seen and discussed with Dr. Delaney who agreed with the assessment and plan.\par

## 2019-10-06 NOTE — PHYSICAL EXAM
[Fully active, able to carry on all pre-disease performance without restriction] : Status 0 - Fully active, able to carry on all pre-disease performance without restriction [Normal] : affect appropriate [de-identified] : Status post right breast Lumpectomy. There is no palpable abnormality in either breast and axilla.

## 2019-10-11 DIAGNOSIS — D05.11 INTRADUCTAL CARCINOMA IN SITU OF RIGHT BREAST: ICD-10-CM

## 2019-10-11 DIAGNOSIS — C50.411 MALIGNANT NEOPLASM OF UPPER-OUTER QUADRANT OF RIGHT FEMALE BREAST: ICD-10-CM

## 2019-12-05 ENCOUNTER — APPOINTMENT (OUTPATIENT)
Dept: BREAST CENTER | Facility: CLINIC | Age: 59
End: 2019-12-05
Payer: COMMERCIAL

## 2019-12-05 VITALS
DIASTOLIC BLOOD PRESSURE: 80 MMHG | WEIGHT: 178 LBS | HEIGHT: 61 IN | BODY MASS INDEX: 33.61 KG/M2 | SYSTOLIC BLOOD PRESSURE: 132 MMHG | TEMPERATURE: 98.5 F

## 2019-12-05 PROCEDURE — 99213 OFFICE O/P EST LOW 20 MIN: CPT

## 2019-12-06 NOTE — HISTORY OF PRESENT ILLNESS
[FreeTextEntry1] : Patient with Right mod diff IDC/DCIS intermed nuclear grade on NC 7/30/12; 10:00 N4, 2.7 cm.  ER/[place] (+).\par Right FC changewith florid ductal hyperplasia, sclerosing adenosis and Ca++ on NC 7/30/12; \par 7-8:00 N3, 1.4 cm.\par Right hyalinized FA and focal acute mastitis on NC 7/30/12; 9:00 N1-2.\par Left complex cyst FNA 7/30/12, aspirated to resolution, 10:00 N3-4.\par s/p Right NLOC/SNB 9/12/12- 1/1 (+) ITC present on final, 2.1 cm mod diff IDC, non extensive DCIS intermed nuclear grade; (-) margins.  FA, Lymphovascular invasion.\par Onctype RS 25.  (+) Chemo - Dr. Delaney.  On Arimidex.  (+) whole breast RT - Dr. Padilla.\par \par MAI URIARTE is a 59 year old female patient who presents today in follow up for right breast cancer.\par Since her last visit, she has no new breast related complaints. \par Imaging was done on 08/20/19, which revealed no mammographic evidence of malignancy.. \par \par She presents today for evaluation and imaging review.

## 2019-12-06 NOTE — REVIEW OF SYSTEMS
[Negative] : Constitutional [Breast Pain] : no breast pain [Nipple Inverted] : no inversion of the nipple [Breast Lump] : no breast lump [Nipple Discharge] : no nipple discharge

## 2019-12-06 NOTE — ASSESSMENT
[FreeTextEntry1] : MAI is a dana 59 year old patient who presented today in follow up for a history of right breast cancer. \par She has been doing well with no complaints. \par Imaging was done recently which revealed no mammographic evidence of malignancy.\par \par Imaging with bilateral breast MRI will be due in May 2020, and that will be scheduled today. \par She will return for follow-up and clinical breast exam in approximately six months.\par She will continue follow-up with medical oncology as well.\par \par I spent a total of 15 minutes of face to face time with this patient, greater than 50% of which was spent in counseling and/or coordination of care.\par All of her questions were appropriately answered.\par She knows to call with any concerns.\par \par MAI URIARTE has been enrolled in the breast cancer surgical survivorship care program.\par A copy of her survivorship care plan has been provided to her as well.

## 2019-12-06 NOTE — DATA REVIEWED
[FreeTextEntry1] : B/L Screening Mammo - 08/20/19:\par MAMMOGRAM FINDINGS: \par Mammography was performed including the following views: bilateral craniocaudal with tomosynthesis, bilateral mediolateral oblique with tomosynthesis. The examination includes digital synthetic 2D and digital tomosynthesis 3D images. Additional imaging analysis was performed using CAD (computer-aided detection) software. \par There are scattered areas of fibroglandular density. \par There is an area of architectural distortion at the site of lumpectomy seen in the right breast. \par No suspicious mass, grouping of calcifications, or other abnormality is identified. \par IMPRESSION: \par There is no mammographic evidence of malignancy. \par RECOMMENDATION: \par Unless otherwise indicated by clinical findings, annual screening mammography recommended. \par ASSESSMENT: \par BI-RADS Category 2: Benign

## 2019-12-06 NOTE — PHYSICAL EXAM
[Atraumatic] : atraumatic [Normocephalic] : normocephalic [No Nipple Discharge] : no right nipple discharge [No dominant masses] : no dominant masses in right breast  [No dominant masses] : no dominant masses left breast [No Rashes] : no rashes [No Ulceration] : no ulceration [de-identified] : well healed surgical scars. \par some palpable scar tissue.  [Breast Nipple Inversion] : nipples not inverted [Breast Nipple Retraction] : nipples not retracted [de-identified] : No axillary lymphadenopathy appreciated. [de-identified] : No axillary lymphadenopathy appreciated.

## 2019-12-06 NOTE — PAST MEDICAL HISTORY
[Menarche Age ____] : age at menarche was [unfilled] [Total Preg ___] : G[unfilled] [Age At Live Birth ___] : Age at live birth: [unfilled] [Live Births ___] : P[unfilled]  [History of Hormone Replacement Treatment] : has no history of hormone replacement treatment [FreeTextEntry5] : none [FreeTextEntry6] : none [FreeTextEntry8] : none [FreeTextEntry7] : none

## 2019-12-16 ENCOUNTER — RX RENEWAL (OUTPATIENT)
Age: 59
End: 2019-12-16

## 2019-12-16 RX ORDER — ANASTROZOLE TABLETS 1 MG/1
1 TABLET ORAL DAILY
Qty: 90 | Refills: 1 | Status: ACTIVE | COMMUNITY
Start: 1900-01-01 | End: 1900-01-01

## 2020-02-17 ENCOUNTER — OUTPATIENT (OUTPATIENT)
Dept: OUTPATIENT SERVICES | Facility: HOSPITAL | Age: 60
LOS: 1 days | Discharge: HOME | End: 2020-02-17
Payer: COMMERCIAL

## 2020-02-17 DIAGNOSIS — R06.00 DYSPNEA, UNSPECIFIED: ICD-10-CM

## 2020-02-17 DIAGNOSIS — M54.2 CERVICALGIA: ICD-10-CM

## 2020-02-17 PROCEDURE — 71046 X-RAY EXAM CHEST 2 VIEWS: CPT | Mod: 26

## 2020-02-17 PROCEDURE — 72050 X-RAY EXAM NECK SPINE 4/5VWS: CPT | Mod: 26

## 2020-02-19 ENCOUNTER — APPOINTMENT (OUTPATIENT)
Dept: OBGYN | Facility: CLINIC | Age: 60
End: 2020-02-19
Payer: COMMERCIAL

## 2020-02-19 ENCOUNTER — ASOB RESULT (OUTPATIENT)
Age: 60
End: 2020-02-19

## 2020-02-19 VITALS
HEIGHT: 61 IN | SYSTOLIC BLOOD PRESSURE: 130 MMHG | HEART RATE: 79 BPM | BODY MASS INDEX: 34.93 KG/M2 | DIASTOLIC BLOOD PRESSURE: 81 MMHG | WEIGHT: 185 LBS

## 2020-02-19 DIAGNOSIS — N20.0 CALCULUS OF KIDNEY: ICD-10-CM

## 2020-02-19 DIAGNOSIS — Z78.9 OTHER SPECIFIED HEALTH STATUS: ICD-10-CM

## 2020-02-19 DIAGNOSIS — Z86.39 PERSONAL HISTORY OF OTHER ENDOCRINE, NUTRITIONAL AND METABOLIC DISEASE: ICD-10-CM

## 2020-02-19 DIAGNOSIS — Z78.0 ASYMPTOMATIC MENOPAUSAL STATE: ICD-10-CM

## 2020-02-19 PROCEDURE — 99396 PREV VISIT EST AGE 40-64: CPT

## 2020-02-19 PROCEDURE — 76857 US EXAM PELVIC LIMITED: CPT | Mod: 59

## 2020-02-19 PROCEDURE — 76830 TRANSVAGINAL US NON-OB: CPT

## 2020-02-19 NOTE — CHIEF COMPLAINT
[Annual Visit] : annual visit [FreeTextEntry1] : Patient is here for annual exam , up to date with PE , blood work , personal hx breast Ca  with Right breast lumpectomy 2012 on Anastrozole  under breast specialist dr Ybarra and dr VAZQUEZ oncology care ,patient denies pelvic pain ,vaginal bleeding

## 2020-02-19 NOTE — DISCUSSION/SUMMARY
[FreeTextEntry1] : Patient here for annual exam. No complaints. History of fibroid uterus and right breast lumpectomy.\par \par \par Chloe Gamino M.D.\par

## 2020-02-19 NOTE — PHYSICAL EXAM
[Awake] : awake [Mass] : no breast mass [Alert] : alert [Acute Distress] : no acute distress [Nipple Discharge] : no nipple discharge [Axillary LAD] : no axillary lymphadenopathy [Soft] : soft [Tender] : non tender [Oriented x3] : oriented to person, place, and time [Vulvar Atrophy] : vulvar atrophy [Atrophy] : atrophy [Normal] : uterus [Rectocele] : a rectocele [Cystocele] : a cystocele [Enlarged ___ wks] : enlarged [unfilled] ~Uweeks [No Bleeding] : there was no active vaginal bleeding [Uterine Adnexae] : were not tender and not enlarged [No Tenderness] : no rectal tenderness [External Hemorrhoid] : an external hemorrhoid

## 2020-02-21 LAB — HPV HIGH+LOW RISK DNA PNL CVX: NOT DETECTED

## 2020-05-18 ENCOUNTER — RESULT REVIEW (OUTPATIENT)
Age: 60
End: 2020-05-18

## 2020-05-18 ENCOUNTER — OUTPATIENT (OUTPATIENT)
Dept: OUTPATIENT SERVICES | Facility: HOSPITAL | Age: 60
LOS: 1 days | Discharge: HOME | End: 2020-05-18
Payer: COMMERCIAL

## 2020-05-18 DIAGNOSIS — C50.411 MALIGNANT NEOPLASM OF UPPER-OUTER QUADRANT OF RIGHT FEMALE BREAST: ICD-10-CM

## 2020-05-18 PROCEDURE — 77049 MRI BREAST C-+ W/CAD BI: CPT | Mod: 26

## 2020-05-20 ENCOUNTER — APPOINTMENT (OUTPATIENT)
Dept: OBGYN | Facility: CLINIC | Age: 60
End: 2020-05-20
Payer: COMMERCIAL

## 2020-05-20 ENCOUNTER — ASOB RESULT (OUTPATIENT)
Age: 60
End: 2020-05-20

## 2020-05-20 DIAGNOSIS — D25.9 LEIOMYOMA OF UTERUS, UNSPECIFIED: ICD-10-CM

## 2020-05-20 PROCEDURE — 76830 TRANSVAGINAL US NON-OB: CPT

## 2020-06-18 ENCOUNTER — APPOINTMENT (OUTPATIENT)
Dept: BREAST CENTER | Facility: CLINIC | Age: 60
End: 2020-06-18
Payer: COMMERCIAL

## 2020-06-18 PROCEDURE — 99213 OFFICE O/P EST LOW 20 MIN: CPT

## 2020-06-18 NOTE — PAST MEDICAL HISTORY
[Menarche Age ____] : age at menarche was [unfilled] [Total Preg ___] : G[unfilled] [Live Births ___] : P[unfilled]  [Age At Live Birth ___] : Age at live birth: [unfilled] [History of Hormone Replacement Treatment] : has no history of hormone replacement treatment [FreeTextEntry5] : none [FreeTextEntry6] : none [FreeTextEntry7] : none [FreeTextEntry8] : none

## 2020-06-18 NOTE — HISTORY OF PRESENT ILLNESS
[FreeTextEntry1] : Patient with Right mod diff IDC/DCIS intermed nuclear grade on NC 7/30/12; 10:00 N4, 2.7 cm.  ER/[place] (+).\par Right FC changewith florid ductal hyperplasia, sclerosing adenosis and Ca++ on NC 7/30/12; \par 7-8:00 N3, 1.4 cm.\par Right hyalinized FA and focal acute mastitis on NC 7/30/12; 9:00 N1-2.\par Left complex cyst FNA 7/30/12, aspirated to resolution, 10:00 N3-4.\par s/p Right NLOC/SNB 9/12/12- 1/1 (+) ITC present on final, 2.1 cm mod diff IDC, non extensive DCIS intermed nuclear grade; (-) margins.  FA, Lymphovascular invasion.\par Onctype RS 25.  (+) Chemo - Dr. Delaney.  On Arimidex.  (+) whole breast RT - Dr. Padilla.\par \par MAI URIARTE is a 59 year old female patient who presents today in follow up for right breast cancer.\par Since her last visit, she has no new breast related complaints. \par Imaging was done on 05/18/20, which revealed no MR evidence of malignancy in either breast. Stable postsurgical changes of the right breast.\par \par She presents today for evaluation and imaging review.

## 2020-06-18 NOTE — PHYSICAL EXAM
[Normocephalic] : normocephalic [Atraumatic] : atraumatic [No dominant masses] : no dominant masses in right breast  [No dominant masses] : no dominant masses left breast [No Nipple Discharge] : no left nipple discharge [No Ulceration] : no ulceration [No Rashes] : no rashes [Breast Nipple Inversion] : nipples not inverted [Breast Nipple Retraction] : nipples not retracted [de-identified] : well healed surgical scars. \par some palpable scar tissue.  [de-identified] : No axillary lymphadenopathy appreciated. [de-identified] : No axillary lymphadenopathy appreciated.

## 2020-06-18 NOTE — REVIEW OF SYSTEMS
[Negative] : Constitutional [Breast Pain] : no breast pain [Breast Lump] : no breast lump [Nipple Inverted] : no inversion of the nipple [Nipple Discharge] : no nipple discharge

## 2020-06-18 NOTE — DATA REVIEWED
[FreeTextEntry1] : B/L Breast MRI - 05/18/2020:\par Findings:\par \par Amount of fibroglandular tissue: Scattered fibroglandular tissue\par \par Background parenchymal enhancement: Minimal, Symmetric\par \par RIGHT BREAST:\par No enhancing mass, architectural distortion, or suspicious area of enhancement is identified. Stable postsurgical distortion the upper outer quadrant of prior lumpectomy site.\par \par The nipple and skin appear normal.\par There is no axillary adenopathy.\par \par LEFT BREAST:\par No enhancing mass, architectural distortion, or suspicious area of enhancement is identified.\par \par Stable benign masses are again noted in the left breast.\par \par The nipple and skin appear normal.\par There is no axillary adenopathy.\par \par The imaged portions of the chest and abdomen are unremarkable.\par \par Impression:\par \par No MR evidence of malignancy in either breast. Stable postsurgical changes of the right breast.\par \par Recommendation: Unless otherwise indicated by clinical findings, annual screening mammography recommended. Breast MRI on an alternating 6 month schedule is also recommended in this high risk patient.\par \par BI-RADS Category 2: Benign\par

## 2020-06-18 NOTE — ASSESSMENT
[FreeTextEntry1] : MAI is a dana 59 year old patient who presented today in follow up for a history of right breast cancer. \par She has been doing well with no complaints. \par Imaging was done recently which revealed no MR evidence of malignancy in either breast. Stable postsurgical changes of the right breast.\par \par Imaging with bilateral screening mammogram will be due in August 2020, and that will be scheduled today. \par She will return for follow-up and clinical breast exam in approximately six months.\par She will continue follow-up with medical oncology as well.\par \par I spent a total of 15 minutes of face to face time with this patient, greater than 50% of which was spent in counseling and/or coordination of care.\par All of her questions were appropriately answered.\par She knows to call with any concerns.\par \par MAI URIARTE has been enrolled in the breast cancer surgical survivorship care program.\par A copy of her survivorship care plan has been provided to her as well.

## 2020-07-06 ENCOUNTER — OUTPATIENT (OUTPATIENT)
Dept: OUTPATIENT SERVICES | Facility: HOSPITAL | Age: 60
LOS: 1 days | Discharge: HOME | End: 2020-07-06

## 2020-07-06 ENCOUNTER — APPOINTMENT (OUTPATIENT)
Dept: HEMATOLOGY ONCOLOGY | Facility: CLINIC | Age: 60
End: 2020-07-06
Payer: COMMERCIAL

## 2020-07-06 VITALS
WEIGHT: 188 LBS | SYSTOLIC BLOOD PRESSURE: 140 MMHG | BODY MASS INDEX: 35.5 KG/M2 | DIASTOLIC BLOOD PRESSURE: 89 MMHG | TEMPERATURE: 97.5 F | HEIGHT: 61 IN | HEART RATE: 79 BPM

## 2020-07-06 PROCEDURE — 99213 OFFICE O/P EST LOW 20 MIN: CPT

## 2020-07-08 NOTE — HISTORY OF PRESENT ILLNESS
[de-identified] : A 57yearold female is here today for a followup visit.  She has a stage IIA pT2 N0M0  invasive ductal carcinoma of the right breast, ER/[place] positive, HER2/ananth negative.  She had a right breast lumpectomy and axillary lymph node biopsy in 09/2012.  Her Oncotype recurrence score was 25.  She elected to have adjuvant chemotherapy.  She received Taxotere, cyclophosphamide for four cycles.  After that, she had received a course of adjuvant breast radiotherapy and finished in 03/2013.  She has been taking adjuvant endocrine therapy with anastrozole since 04/2013.  She developed muscular aching and was enrolled in SWOG phase III trial evaluating Duloxetine in women who developed musculoskeletal aching due to an aromatase inhibitor.  She took the medicine for three months and completed study.  \par \par She had last bone density done on 07/29/2016, which showed osteopenia in the lumbar spine.  She is taking calcium and vitamin D. She had Mammogram done on 07/31/2017  It showed stable calcified mass in the left breast for over 2 year.  She scheduled for b/l breast MRI 4/11/2018.  [de-identified] : Today,  she reports feeling well.  She is working full time as a .  She had her bone density done on 07/29/2016, which showed osteopenia in the lumbar spine.  She is taking calcium and vitamin D.\par \par On 5/2/18  she had B/l breast MRI with no suspicious finding .  She scheduled for b/l  breast mammogram on 8/1/18 and will be due for Bone density in 8/1/18 . \par \par The BCI test was done on her previous surgical specimen, which showed high risk of cancer recurrent from 5 to 10 at 6.1%. The study shows low likelihood benefit from extended endocrine therapy.\par  \par on 9/26/18 patient came for scheduled follow up visit, she reported feeling well. She still working as a full time teacher. We  re communicate the extend of the endocrine therapy. also we reviewed Bone density result reveal osteopenia in the Spin region. Patient reported she is more comfortable to continue Endocrine therapy for 2 more year. \par Bone Density: from 8/1/18 \par ----------------------------------------------------------------- \par Region BMD T-score Z-score Classification \par ----------------------------------------------------------------- \par AP Spine (L1-L4) 0.838 -1.9 -0.6 Osteopenia \par Femoral Neck (Left) 0.831 -0.2 1.0 Normal \par Total Hip (Left) 0.928 -0.1 0.7 Normal \par \par Patient had Mammogram on 8/1/18 \par -----------------------------------------------------------------\par No suspicious masses, calcifications or other abnormalities are seen. \par \par When compared to prior studies there is no significant change. \par \par IMPRESSION: \par There is no evidence of malignancy\par \par 3/27/19\par Patient is here today for follow up visit. She takes Anastrozole since 4/2013.  Although she c/o joint pains and weight gain, she prefers to complete 7 years of Anastrozole.  Her latest mammogram on 8/2018 showed no malignancy. Next MRI breasts due on 4/2019. Labs reviewed. Slightly elevated ALT =94.1, Ca=10.2.  Bone density 8/2018 showed Osteopenia.\par \par The BCI test was done on her previous surgical specimen, which showed high risk of cancer recurrent from 5 to 10 at 6.1%. The study shows low likelihood benefit from extended endocrine therapy.\par \par 9/25/19\par Patient is here today for follow up visit. She has been taking Anastrozole since 4/2013. She has chronic arthralgia. She had b/l screening mammo in 8/2019. There was no suspicious finding. Bone density 8/2018 showed Osteopenia. She does not have breast related complains.\par \par 7/6/2020\par 58 yo female is here today for follow up visit for h/o stage IIA ER/VA positive, HER2/ananth negative invasive ductal carcinoma of the right breast.  She completed 7 years of Anastrozole 5/2020.  She offers no new complaints.  Last mammogram was done 8/2019 which showed no evidence of malignancy.  MRI breast done 5/18/2020 which shows no MR evidence of malignancy in either breast. Stable postsurgical changes of the right breast. Last bone density was done 8/2018 which showed osteopenia.  She is taking calcium and vitamin D. Previous labs reviewed and stable.\par Her health was not affected by COVID pandemic; however, her work as  at [employer] is very busy.

## 2020-07-08 NOTE — PHYSICAL EXAM
[Fully active, able to carry on all pre-disease performance without restriction] : Status 0 - Fully active, able to carry on all pre-disease performance without restriction [Normal] : affect appropriate [de-identified] : Status post right breast Lumpectomy. There is no palpable abnormality in either breast and axilla.

## 2020-07-08 NOTE — ASSESSMENT
[FreeTextEntry1] : Stage IIA ER/OH positive, HER2/ananth negative invasive ductal carcinoma of the right breast, status post lumpectomy, sentinel lymph node biopsy, adjuvant chemotherapy, adjuvant breast therapy, radiotherapy, currently on adjuvant endocrine therapy.  There is no clinical evidence of recurrent disease.\par Oncotype RS 26.\par \par PLAN: \par -- Completed Anastrozole 5/2020.\par -- Continue calcium and vitamin D supplement.\par -- Encourage more physical activities.\par -- Bone density prescription given (due 8/2020).\par -- Annual MRI breasts and screening mammo alternatively.  \par -- Followup with PCP for health maintenance.\par -- Followup in one year with Dr. Delaney.\par \par Case was seen and discussed with Dr. Delaney who agreed with the assessment and plan.\par

## 2020-08-19 DIAGNOSIS — C50.411 MALIGNANT NEOPLASM OF UPPER-OUTER QUADRANT OF RIGHT FEMALE BREAST: ICD-10-CM

## 2020-08-25 ENCOUNTER — RESULT REVIEW (OUTPATIENT)
Age: 60
End: 2020-08-25

## 2020-08-25 ENCOUNTER — OUTPATIENT (OUTPATIENT)
Dept: OUTPATIENT SERVICES | Facility: HOSPITAL | Age: 60
LOS: 1 days | Discharge: HOME | End: 2020-08-25
Payer: COMMERCIAL

## 2020-08-25 DIAGNOSIS — Z12.31 ENCOUNTER FOR SCREENING MAMMOGRAM FOR MALIGNANT NEOPLASM OF BREAST: ICD-10-CM

## 2020-08-25 PROCEDURE — 77063 BREAST TOMOSYNTHESIS BI: CPT | Mod: 26

## 2020-08-25 PROCEDURE — 77067 SCR MAMMO BI INCL CAD: CPT | Mod: 26

## 2020-08-26 DIAGNOSIS — Z13.820 ENCOUNTER FOR SCREENING FOR OSTEOPOROSIS: ICD-10-CM

## 2020-08-26 DIAGNOSIS — M89.9 DISORDER OF BONE, UNSPECIFIED: ICD-10-CM

## 2020-08-26 DIAGNOSIS — Z78.0 ASYMPTOMATIC MENOPAUSAL STATE: ICD-10-CM

## 2021-01-21 ENCOUNTER — TRANSCRIPTION ENCOUNTER (OUTPATIENT)
Age: 61
End: 2021-01-21

## 2021-03-07 ENCOUNTER — TRANSCRIPTION ENCOUNTER (OUTPATIENT)
Age: 61
End: 2021-03-07

## 2021-04-28 ENCOUNTER — APPOINTMENT (OUTPATIENT)
Dept: BREAST CENTER | Facility: CLINIC | Age: 61
End: 2021-04-28
Payer: COMMERCIAL

## 2021-04-28 PROCEDURE — 99072 ADDL SUPL MATRL&STAF TM PHE: CPT

## 2021-04-28 PROCEDURE — 99213 OFFICE O/P EST LOW 20 MIN: CPT

## 2021-04-30 NOTE — HISTORY OF PRESENT ILLNESS
[FreeTextEntry1] : Patient with Right mod diff IDC/DCIS intermed nuclear grade on NC 7/30/12; 10:00 N4, 2.7 cm.  ER/UT (+).\par Right FC changewith florid ductal hyperplasia, sclerosing adenosis and Ca++ on NC 7/30/12; \par 7-8:00 N3, 1.4 cm.\par Right hyalinized FA and focal acute mastitis on NC 7/30/12; 9:00 N1-2.\par Left complex cyst FNA 7/30/12, aspirated to resolution, 10:00 N3-4.\par s/p Right NLOC/SNB 9/12/12- 1/1 (+) ITC present on final, 2.1 cm mod diff IDC, non extensive DCIS intermed nuclear grade; (-) margins.  FA, Lymphovascular invasion.\par Onctype RS 25.  (+) Chemo - Dr. Delaney.  On Arimidex.  (+) whole breast RT - Dr. Padilla.\par \par MAI URIARTE is a 60 year old female patient who presents today in follow up for right breast cancer.\par Over the past 4-6 weeks she has been experiencing sporadic breast pain.\par Her most recent imaging was done on 08/25/2020, which revealed no mammographic evidence of malignancy.\par \par She presents today for evaluation.

## 2021-04-30 NOTE — DATA REVIEWED
[FreeTextEntry1] : B/L Screening Mammo - 08/25/2020:\par MAMMOGRAM FINDINGS:\par Mammography was performed including the following views: bilateral craniocaudal with tomosynthesis, bilateral mediolateral oblique with tomosynthesis, and right craniocaudal exaggerated laterally with tomosynthesis.  The examination includes digital synthetic 2D and digital tomosynthesis 3D images. Additional imaging analysis was performed using CAD (computer-aided detection) software.\par \par There are scattered areas of fibroglandular density.\par \par Finding 1:  There is an area of architectural distortion at the site of lumpectomy seen in the right breast.\par \par Finding 2:  There are scattered stable asymmetries seen in the left breast.\par \par No suspicious mass, grouping of calcifications, or other abnormality is identified.\par \par IMPRESSION:\par There is no mammographic evidence of malignancy.\par \par RECOMMENDATION:\par Unless otherwise indicated by clinical findings, annual screening mammography recommended.\par \par ASSESSMENT:\par BI-RADS Category 2:  Benign

## 2021-04-30 NOTE — ASSESSMENT
[FreeTextEntry1] : MAI is a dana 60 year old patient who presented today in follow up for a history of right breast cancer. \par She has been doing well but over the past 4-6 weeks she has been experiencing sporadic breast pain.\par Her most recent imaging was done on 08/25/2020, which revealed no mammographic evidence of malignancy, as detailed above.\par \par Imaging with bilateral breast MRI will be due ordered for May 2021, and that will be scheduled today. \par We will plan to discuss these results via telephone.\par If there are no changes to explain her new onset breast pain we may order diagnostic mammogram and sonogram if it remains persistent.\par Further recommendations to be made once results are available.\par She will continue follow-up with medical oncology as well.\par \par I spent a total of 20 minutes of face to face time with this patient, greater than 50% of which was spent in counseling and/or coordination of care.\par All of her questions were appropriately answered.\par She knows to call with any concerns.\par \par MAI URIARTE has been enrolled in the breast cancer surgical survivorship care program.\par A copy of her survivorship care plan has been provided to her as well.

## 2021-04-30 NOTE — PHYSICAL EXAM
[Normocephalic] : normocephalic [Atraumatic] : atraumatic [No Supraclavicular Adenopathy] : no supraclavicular adenopathy [No dominant masses] : no dominant masses in right breast  [No dominant masses] : no dominant masses left breast [No Nipple Discharge] : no left nipple discharge [No Rashes] : no rashes [No Ulceration] : no ulceration [Breast Nipple Inversion] : nipples not inverted [Breast Nipple Retraction] : nipples not retracted [de-identified] : well healed surgical scars. \par some palpable scar tissue.  [de-identified] : No axillary lymphadenopathy appreciated. [de-identified] : No axillary lymphadenopathy appreciated.

## 2021-05-30 ENCOUNTER — RESULT REVIEW (OUTPATIENT)
Age: 61
End: 2021-05-30

## 2021-05-30 ENCOUNTER — OUTPATIENT (OUTPATIENT)
Dept: OUTPATIENT SERVICES | Facility: HOSPITAL | Age: 61
LOS: 1 days | Discharge: HOME | End: 2021-05-30
Payer: COMMERCIAL

## 2021-05-30 DIAGNOSIS — C50.411 MALIGNANT NEOPLASM OF UPPER-OUTER QUADRANT OF RIGHT FEMALE BREAST: ICD-10-CM

## 2021-05-30 PROCEDURE — 77049 MRI BREAST C-+ W/CAD BI: CPT | Mod: 26

## 2021-06-02 ENCOUNTER — NON-APPOINTMENT (OUTPATIENT)
Age: 61
End: 2021-06-02

## 2021-07-07 ENCOUNTER — APPOINTMENT (OUTPATIENT)
Dept: HEMATOLOGY ONCOLOGY | Facility: CLINIC | Age: 61
End: 2021-07-07
Payer: COMMERCIAL

## 2021-07-07 ENCOUNTER — ASOB RESULT (OUTPATIENT)
Age: 61
End: 2021-07-07

## 2021-07-07 ENCOUNTER — APPOINTMENT (OUTPATIENT)
Dept: OBGYN | Facility: CLINIC | Age: 61
End: 2021-07-07

## 2021-07-07 ENCOUNTER — OUTPATIENT (OUTPATIENT)
Dept: OUTPATIENT SERVICES | Facility: HOSPITAL | Age: 61
LOS: 1 days | Discharge: HOME | End: 2021-07-07

## 2021-07-07 ENCOUNTER — APPOINTMENT (OUTPATIENT)
Dept: OBGYN | Facility: CLINIC | Age: 61
End: 2021-07-07
Payer: COMMERCIAL

## 2021-07-07 VITALS
DIASTOLIC BLOOD PRESSURE: 82 MMHG | TEMPERATURE: 97.8 F | HEART RATE: 81 BPM | BODY MASS INDEX: 34.93 KG/M2 | SYSTOLIC BLOOD PRESSURE: 128 MMHG | WEIGHT: 185 LBS | HEIGHT: 61 IN

## 2021-07-07 VITALS
BODY MASS INDEX: 35.5 KG/M2 | WEIGHT: 188 LBS | HEIGHT: 61 IN | DIASTOLIC BLOOD PRESSURE: 80 MMHG | SYSTOLIC BLOOD PRESSURE: 135 MMHG | HEART RATE: 95 BPM | TEMPERATURE: 99.4 F

## 2021-07-07 DIAGNOSIS — N95.0 POSTMENOPAUSAL BLEEDING: ICD-10-CM

## 2021-07-07 DIAGNOSIS — N83.201 UNSPECIFIED OVARIAN CYST, RIGHT SIDE: ICD-10-CM

## 2021-07-07 DIAGNOSIS — Z82.49 FAMILY HISTORY OF ISCHEMIC HEART DISEASE AND OTHER DISEASES OF THE CIRCULATORY SYSTEM: ICD-10-CM

## 2021-07-07 LAB
BASOPHILS # BLD AUTO: 0.05 K/UL
BASOPHILS NFR BLD AUTO: 0.5 %
EOSINOPHIL # BLD AUTO: 0.14 K/UL
EOSINOPHIL NFR BLD AUTO: 1.5 %
HCT VFR BLD CALC: 46.2 %
HGB BLD-MCNC: 15.1 G/DL
IMM GRANULOCYTES NFR BLD AUTO: 0.4 %
LYMPHOCYTES # BLD AUTO: 2.45 K/UL
LYMPHOCYTES NFR BLD AUTO: 26 %
MAN DIFF?: NORMAL
MCHC RBC-ENTMCNC: 29.8 PG
MCHC RBC-ENTMCNC: 32.7 G/DL
MCV RBC AUTO: 91.1 FL
MONOCYTES # BLD AUTO: 0.62 K/UL
MONOCYTES NFR BLD AUTO: 6.6 %
NEUTROPHILS # BLD AUTO: 6.11 K/UL
NEUTROPHILS NFR BLD AUTO: 65 %
PLATELET # BLD AUTO: 303 K/UL
RBC # BLD: 5.07 M/UL
RBC # FLD: 12.4 %
WBC # FLD AUTO: 9.41 K/UL

## 2021-07-07 PROCEDURE — 99072 ADDL SUPL MATRL&STAF TM PHE: CPT

## 2021-07-07 PROCEDURE — 76831 ECHO EXAM UTERUS: CPT

## 2021-07-07 PROCEDURE — 99396 PREV VISIT EST AGE 40-64: CPT

## 2021-07-07 PROCEDURE — 58100 BIOPSY OF UTERUS LINING: CPT

## 2021-07-07 PROCEDURE — 99213 OFFICE O/P EST LOW 20 MIN: CPT

## 2021-07-07 NOTE — HISTORY OF PRESENT ILLNESS
[Patient reported mammogram was normal] : Patient reported mammogram was normal [Patient reported PAP Smear was normal] : Patient reported PAP Smear was normal [Patient reported colonoscopy was normal] : Patient reported colonoscopy was normal [No] : none [Menarche Age: ____] : age at menarche was [unfilled] [Menopause Age: ____] : age at menopause was [unfilled] [TextBox_4] : GYHX\par h/o fibroids\par h/o breast cancer 2012\par osteopenia\par  daughter did BRCA - negative  [Mammogramdate] : 8-2020 [PapSmeardate] : 2-2020 [BoneDensityDate] : 8-2020 [ColonoscopyDate] : 2016 [PGHxTotal] : 2 [[facility] Hospital[facility]] : 1 [PGHxABSpont] : 1 [FreeTextEntry1] : 1   1 sab with d &C

## 2021-07-07 NOTE — PHYSICAL EXAM
[Appropriately responsive] : appropriately responsive [Alert] : alert [No Acute Distress] : no acute distress [Soft] : soft [Non-tender] : non-tender [Non-distended] : non-distended [No HSM] : No HSM [No Lesions] : no lesions [No Mass] : no mass [Oriented x3] : oriented x3 [FreeTextEntry6] : done by breast specialist 4/21 [Labia Majora] : normal [Labia Minora] : normal [Cystocele] : a cystocele [Rectocele] : a rectocele [Normal] : normal [Uterine Adnexae] : normal [FreeTextEntry5] : tinge of blood at cvx,

## 2021-07-07 NOTE — DISCUSSION/SUMMARY
[FreeTextEntry1] : \par 59 yo  for annual exam and PMB, cystocele, rectocele, marvin\par pap hpv\par  pelvic sono - ovarian cyst\par hysterosonogram- uterine polyp\par EMB done\par cbc cea ca125\par d &C hsyto /myosure for polyp d/w patient

## 2021-07-07 NOTE — REVIEW OF SYSTEMS
[Negative] : Allergic/Immunologic [Dysmenorrhea/Abn Vaginal Bleeding] : dysmenorrhea/abnormal vaginal bleeding

## 2021-07-07 NOTE — PROCEDURE
[Cervical Pap Smear] : cervical Pap smear [Liquid Base] : liquid base [Tolerated Well] : the patient tolerated the procedure well [No Complications] : there were no complications [Transvaginal Ultrasound] : transvaginal ultrasound [Saline Infusion Sonography] : saline infusion sonography [FreeTextEntry4] :  uterine polyp visualized 2+ cm\par ovarian cyst simple seen \par

## 2021-07-08 LAB
CANCER AG125 SERPL-ACNC: 25 U/ML
CEA SERPL-MCNC: 0.8 NG/ML

## 2021-07-12 LAB
CORE LAB BIOPSY: NORMAL
HPV HIGH+LOW RISK DNA PNL CVX: NOT DETECTED

## 2021-07-13 ENCOUNTER — NON-APPOINTMENT (OUTPATIENT)
Age: 61
End: 2021-07-13

## 2021-07-13 DIAGNOSIS — N84.0 POLYP OF CORPUS UTERI: ICD-10-CM

## 2021-07-13 PROBLEM — N95.0 POSTMENOPAUSAL BLEEDING: Status: ACTIVE | Noted: 2021-07-07

## 2021-07-13 NOTE — PHYSICAL EXAM
[Fully active, able to carry on all pre-disease performance without restriction] : Status 0 - Fully active, able to carry on all pre-disease performance without restriction [Normal] : affect appropriate [de-identified] : Status post right breast Lumpectomy. There is no palpable abnormality in either breast and axilla.

## 2021-07-13 NOTE — HISTORY OF PRESENT ILLNESS
[de-identified] : A 57yearold female is here today for a followup visit.  She has a stage IIA pT2 N0M0  invasive ductal carcinoma of the right breast, ER/CO positive, HER2/ananth negative.  She had a right breast lumpectomy and axillary lymph node biopsy in 09/2012.  Her Oncotype recurrence score was 25.  She elected to have adjuvant chemotherapy.  She received Taxotere, cyclophosphamide for four cycles.  After that, she had received a course of adjuvant breast radiotherapy and finished in 03/2013.  She has been taking adjuvant endocrine therapy with anastrozole since 04/2013.  She developed muscular aching and was enrolled in SWOG phase III trial evaluating Duloxetine in women who developed musculoskeletal aching due to an aromatase inhibitor.  She took the medicine for three months and completed study.  \par \par She had last bone density done on 07/29/2016, which showed osteopenia in the lumbar spine.  She is taking calcium and vitamin D. She had Mammogram done on 07/31/2017  It showed stable calcified mass in the left breast for over 2 year.  She scheduled for b/l breast MRI 4/11/2018.  [de-identified] : Today,  she reports feeling well.  She is working full time as a .  She had her bone density done on 07/29/2016, which showed osteopenia in the lumbar spine.  She is taking calcium and vitamin D.\par \par On 5/2/18  she had B/l breast MRI with no suspicious finding .  She scheduled for b/l  breast mammogram on 8/1/18 and will be due for Bone density in 8/1/18 . \par \par The BCI test was done on her previous surgical specimen, which showed high risk of cancer recurrent from 5 to 10 at 6.1%. The study shows low likelihood benefit from extended endocrine therapy.\par  \par on 9/26/18 patient came for scheduled follow up visit, she reported feeling well. She still working as a full time teacher. We  re communicate the extend of the endocrine therapy. also we reviewed Bone density result reveal osteopenia in the Spin region. Patient reported she is more comfortable to continue Endocrine therapy for 2 more year. \par Bone Density: from 8/1/18 \par ----------------------------------------------------------------- \par Region BMD T-score Z-score Classification \par ----------------------------------------------------------------- \par AP Spine (L1-L4) 0.838 -1.9 -0.6 Osteopenia \par Femoral Neck (Left) 0.831 -0.2 1.0 Normal \par Total Hip (Left) 0.928 -0.1 0.7 Normal \par \par Patient had Mammogram on 8/1/18 \par -----------------------------------------------------------------\par No suspicious masses, calcifications or other abnormalities are seen. \par \par When compared to prior studies there is no significant change. \par \par IMPRESSION: \par There is no evidence of malignancy\par \par 3/27/19\par Patient is here today for follow up visit. She takes Anastrozole since 4/2013.  Although she c/o joint pains and weight gain, she prefers to complete 7 years of Anastrozole.  Her latest mammogram on 8/2018 showed no malignancy. Next MRI breasts due on 4/2019. Labs reviewed. Slightly elevated ALT =94.1, Ca=10.2.  Bone density 8/2018 showed Osteopenia.\par \par The BCI test was done on her previous surgical specimen, which showed high risk of cancer recurrent from 5 to 10 at 6.1%. The study shows low likelihood benefit from extended endocrine therapy.\par \par 9/25/19\par Patient is here today for follow up visit. She has been taking Anastrozole since 4/2013. She has chronic arthralgia. She had b/l screening mammo in 8/2019. There was no suspicious finding. Bone density 8/2018 showed Osteopenia. She does not have breast related complains.\par \par 7/6/2020\par 58 yo female is here today for follow up visit for h/o stage IIA ER/SC positive, HER2/ananth negative invasive ductal carcinoma of the right breast.  She completed 7 years of Anastrozole 5/2020.  She offers no new complaints.  Last mammogram was done 8/2019 which showed no evidence of malignancy.  MRI breast done 5/18/2020 which shows no MR evidence of malignancy in either breast. Stable postsurgical changes of the right breast. Last bone density was done 8/2018 which showed osteopenia.  She is taking calcium and vitamin D. Previous labs reviewed and stable.\par Her health was not affected by COVID pandemic; however, her work as  at [employer] is very busy.\par \par 07/07/2021: MAI is here for follow up visit for Stage IIA ER/SC positive, HER2/ananth negative invasive ductal carcinoma of the right breast, status post lumpectomy, sentinel lymph node biopsy, adjuvant chemotherapy, adjuvant breast therapy, radiotherapy, currently on adjuvant endocrine therapy. There is no clinical evidence of recurrent disease.\par She was complaining of intermittent left breast pain. MRI was done in May 2021 which showed no MR evidence of malignancy in either breast with stable postsurgical changes of the right breast. She recently developed new vaginal bleeding which started Monday night. She saw Dr Cardoza today who completed a sonogram and endometrial biopsy. She was noted to have a polyp; which will most likely the cause of her bleeding.\par

## 2021-07-13 NOTE — ASSESSMENT
[FreeTextEntry1] : Stage IIA ER/MD positive, HER2/ananth negative invasive ductal carcinoma of the right breast, status post lumpectomy, sentinel lymph node biopsy, adjuvant chemotherapy, adjuvant breast therapy, radiotherapy, completed 7 years of adjuvant endocrine therapy in May 2020  There is no clinical evidence of recurrent disease.\par Oncotype RS 26.\par \par PLAN: \par -- Completed Anastrozole 5/2020.\par -- Breast exam today did not reveal palpable abnormality. Reviewed MRI completed in 5/2021. There is no suspicious finding. Due for bilateral screening mammogram in August 2021. Script given to patient \par -- Osteopenia. Reviewed DEXA from 8/2020. Continue Calcium and vitamin D supplement.  Encourage more physical activities.\par -- New postmenopausal vaginal bleeding. S/p pelvic sono which showed ovarian cyst and hyterosonogram which showed a uterine polyp. Follow up with GYN for possible D & C. \par -- Followup with PCP for health maintenance.\par -- Followup in one year with Dr. Delaney or sooner is symptoms arise \par \par Case was seen and discussed with Dr. Dleaney who agreed with the assessment and plan.\par

## 2021-07-15 DIAGNOSIS — C50.411 MALIGNANT NEOPLASM OF UPPER-OUTER QUADRANT OF RIGHT FEMALE BREAST: ICD-10-CM

## 2021-07-15 DIAGNOSIS — D05.11 INTRADUCTAL CARCINOMA IN SITU OF RIGHT BREAST: ICD-10-CM

## 2021-07-20 ENCOUNTER — NON-APPOINTMENT (OUTPATIENT)
Age: 61
End: 2021-07-20

## 2021-07-20 LAB — CYTOLOGY CVX/VAG DOC THIN PREP: ABNORMAL

## 2021-07-23 ENCOUNTER — RESULT REVIEW (OUTPATIENT)
Age: 61
End: 2021-07-23

## 2021-07-23 ENCOUNTER — OUTPATIENT (OUTPATIENT)
Dept: OUTPATIENT SERVICES | Facility: HOSPITAL | Age: 61
LOS: 1 days | Discharge: HOME | End: 2021-07-23
Payer: COMMERCIAL

## 2021-07-23 VITALS
DIASTOLIC BLOOD PRESSURE: 84 MMHG | OXYGEN SATURATION: 97 % | WEIGHT: 186.07 LBS | RESPIRATION RATE: 16 BRPM | TEMPERATURE: 97 F | HEART RATE: 94 BPM | SYSTOLIC BLOOD PRESSURE: 145 MMHG | HEIGHT: 61 IN

## 2021-07-23 DIAGNOSIS — Z98.890 OTHER SPECIFIED POSTPROCEDURAL STATES: Chronic | ICD-10-CM

## 2021-07-23 DIAGNOSIS — N95.0 POSTMENOPAUSAL BLEEDING: ICD-10-CM

## 2021-07-23 DIAGNOSIS — Z01.818 ENCOUNTER FOR OTHER PREPROCEDURAL EXAMINATION: ICD-10-CM

## 2021-07-23 LAB
ALBUMIN SERPL ELPH-MCNC: 4.8 G/DL — SIGNIFICANT CHANGE UP (ref 3.5–5.2)
ALP SERPL-CCNC: 91 U/L — SIGNIFICANT CHANGE UP (ref 30–115)
ALT FLD-CCNC: 54 U/L — HIGH (ref 0–41)
ANION GAP SERPL CALC-SCNC: 12 MMOL/L — SIGNIFICANT CHANGE UP (ref 7–14)
APTT BLD: 32.4 SEC — SIGNIFICANT CHANGE UP (ref 27–39.2)
AST SERPL-CCNC: 25 U/L — SIGNIFICANT CHANGE UP (ref 0–41)
BILIRUB SERPL-MCNC: 0.6 MG/DL — SIGNIFICANT CHANGE UP (ref 0.2–1.2)
BUN SERPL-MCNC: 17 MG/DL — SIGNIFICANT CHANGE UP (ref 10–20)
CALCIUM SERPL-MCNC: 10.1 MG/DL — SIGNIFICANT CHANGE UP (ref 8.5–10.1)
CHLORIDE SERPL-SCNC: 104 MMOL/L — SIGNIFICANT CHANGE UP (ref 98–110)
CO2 SERPL-SCNC: 25 MMOL/L — SIGNIFICANT CHANGE UP (ref 17–32)
CREAT SERPL-MCNC: 1 MG/DL — SIGNIFICANT CHANGE UP (ref 0.7–1.5)
GLUCOSE SERPL-MCNC: 81 MG/DL — SIGNIFICANT CHANGE UP (ref 70–99)
INR BLD: 0.95 RATIO — SIGNIFICANT CHANGE UP (ref 0.65–1.3)
POTASSIUM SERPL-MCNC: 4.7 MMOL/L — SIGNIFICANT CHANGE UP (ref 3.5–5)
POTASSIUM SERPL-SCNC: 4.7 MMOL/L — SIGNIFICANT CHANGE UP (ref 3.5–5)
PROT SERPL-MCNC: 7.2 G/DL — SIGNIFICANT CHANGE UP (ref 6–8)
PROTHROM AB SERPL-ACNC: 10.9 SEC — SIGNIFICANT CHANGE UP (ref 9.95–12.87)
SODIUM SERPL-SCNC: 141 MMOL/L — SIGNIFICANT CHANGE UP (ref 135–146)

## 2021-07-23 PROCEDURE — 71046 X-RAY EXAM CHEST 2 VIEWS: CPT | Mod: 26

## 2021-07-23 PROCEDURE — 93010 ELECTROCARDIOGRAM REPORT: CPT

## 2021-07-23 NOTE — H&P PST ADULT - NSANTHOSAYNRD_GEN_A_CORE
No. EVER screening performed.  STOP BANG Legend: 0-2 = LOW Risk; 3-4 = INTERMEDIATE Risk; 5-8 = HIGH Risk

## 2021-07-23 NOTE — H&P PST ADULT - REASON FOR ADMISSION
61 yo male presents for PAST in preparation for dilation and curettage diagnostic hysteroscopy myosure uterine polyp removal on 8/6/2021 under general anesthesia by Dr. Gentile ([facility]).

## 2021-07-23 NOTE — H&P PST ADULT - HISTORY OF PRESENT ILLNESS
Pt has been post menopausal for many years. Earlier this month pt began to experience irregular vaginal bleeding. Imaging showed a uterine polyp. Pt denies any other symptoms. Now for listed procedure. Denies any chest pain, difficulty breathing, SOB, palpitations, dysuria, URI, or any other infections in the last 2 weeks. Denies any recent travel, contact, or exposure to any persons with known or suspected COVID-19. Pt also denies COVID testing within the last 2 weeks. Pt admits to receiving all doses of Moderna COVID vaccine. Denies any suicidal or homicidal ideations. Pt advised to self quarantine until day of procedure. Exercise tolerance of 1-2 flights of stairs without dyspnea. EVER reviewed with patient. Pt verbalized understanding of all pre-operative instructions.    Anesthesia Alert  NO--Difficult Airway  NO--History of neck surgery or radiation  NO--Limited ROM of neck  NO--History of Malignant hyperthermia  NO--No personal or family history of Pseudocholinesterase deficiency.  NO--Prior Anesthesia Complication  NO--Latex Allergy  NO--Loose teeth  NO--History of Rheumatoid Arthritis  NO--EVER  NO--Bleeding Risk  YES--Other: right arm precautions

## 2021-07-28 PROBLEM — E78.00 PURE HYPERCHOLESTEROLEMIA, UNSPECIFIED: Chronic | Status: ACTIVE | Noted: 2021-07-23

## 2021-07-28 PROBLEM — C50.919 MALIGNANT NEOPLASM OF UNSPECIFIED SITE OF UNSPECIFIED FEMALE BREAST: Chronic | Status: ACTIVE | Noted: 2021-07-23

## 2021-08-06 ENCOUNTER — OUTPATIENT (OUTPATIENT)
Dept: OUTPATIENT SERVICES | Facility: HOSPITAL | Age: 61
LOS: 1 days | Discharge: HOME | End: 2021-08-06
Payer: COMMERCIAL

## 2021-08-06 ENCOUNTER — RESULT REVIEW (OUTPATIENT)
Age: 61
End: 2021-08-06

## 2021-08-06 VITALS
TEMPERATURE: 99 F | SYSTOLIC BLOOD PRESSURE: 129 MMHG | HEIGHT: 61 IN | HEART RATE: 85 BPM | OXYGEN SATURATION: 98 % | DIASTOLIC BLOOD PRESSURE: 81 MMHG | WEIGHT: 186.07 LBS | RESPIRATION RATE: 18 BRPM

## 2021-08-06 VITALS
SYSTOLIC BLOOD PRESSURE: 115 MMHG | DIASTOLIC BLOOD PRESSURE: 57 MMHG | RESPIRATION RATE: 18 BRPM | HEART RATE: 64 BPM | OXYGEN SATURATION: 99 %

## 2021-08-06 DIAGNOSIS — Z98.890 OTHER SPECIFIED POSTPROCEDURAL STATES: Chronic | ICD-10-CM

## 2021-08-06 DIAGNOSIS — N84.0 POLYP OF CORPUS UTERI: ICD-10-CM

## 2021-08-06 DIAGNOSIS — N93.9 ABNORMAL UTERINE AND VAGINAL BLEEDING, UNSPECIFIED: ICD-10-CM

## 2021-08-06 LAB
BASOPHILS # BLD AUTO: 0.05 K/UL — SIGNIFICANT CHANGE UP (ref 0–0.2)
BASOPHILS NFR BLD AUTO: 0.6 % — SIGNIFICANT CHANGE UP (ref 0–1)
EOSINOPHIL # BLD AUTO: 0.15 K/UL — SIGNIFICANT CHANGE UP (ref 0–0.7)
EOSINOPHIL NFR BLD AUTO: 1.9 % — SIGNIFICANT CHANGE UP (ref 0–8)
HCT VFR BLD CALC: 43.4 % — SIGNIFICANT CHANGE UP (ref 37–47)
HGB BLD-MCNC: 14.3 G/DL — SIGNIFICANT CHANGE UP (ref 12–16)
IMM GRANULOCYTES NFR BLD AUTO: 0.5 % — HIGH (ref 0.1–0.3)
LYMPHOCYTES # BLD AUTO: 1.92 K/UL — SIGNIFICANT CHANGE UP (ref 1.2–3.4)
LYMPHOCYTES # BLD AUTO: 23.8 % — SIGNIFICANT CHANGE UP (ref 20.5–51.1)
MCHC RBC-ENTMCNC: 29.7 PG — SIGNIFICANT CHANGE UP (ref 27–31)
MCHC RBC-ENTMCNC: 32.9 G/DL — SIGNIFICANT CHANGE UP (ref 32–37)
MCV RBC AUTO: 90.2 FL — SIGNIFICANT CHANGE UP (ref 81–99)
MONOCYTES # BLD AUTO: 0.49 K/UL — SIGNIFICANT CHANGE UP (ref 0.1–0.6)
MONOCYTES NFR BLD AUTO: 6.1 % — SIGNIFICANT CHANGE UP (ref 1.7–9.3)
NEUTROPHILS # BLD AUTO: 5.41 K/UL — SIGNIFICANT CHANGE UP (ref 1.4–6.5)
NEUTROPHILS NFR BLD AUTO: 67.1 % — SIGNIFICANT CHANGE UP (ref 42.2–75.2)
NRBC # BLD: 0 /100 WBCS — SIGNIFICANT CHANGE UP (ref 0–0)
PLATELET # BLD AUTO: 250 K/UL — SIGNIFICANT CHANGE UP (ref 130–400)
RBC # BLD: 4.81 M/UL — SIGNIFICANT CHANGE UP (ref 4.2–5.4)
RBC # FLD: 12.5 % — SIGNIFICANT CHANGE UP (ref 11.5–14.5)
WBC # BLD: 8.06 K/UL — SIGNIFICANT CHANGE UP (ref 4.8–10.8)
WBC # FLD AUTO: 8.06 K/UL — SIGNIFICANT CHANGE UP (ref 4.8–10.8)

## 2021-08-06 PROCEDURE — 88305 TISSUE EXAM BY PATHOLOGIST: CPT | Mod: 26

## 2021-08-06 PROCEDURE — 58558 HYSTEROSCOPY BIOPSY: CPT

## 2021-08-06 RX ORDER — ACETAMINOPHEN 500 MG
1000 TABLET ORAL ONCE
Refills: 0 | Status: COMPLETED | OUTPATIENT
Start: 2021-08-06 | End: 2021-08-06

## 2021-08-06 RX ORDER — SODIUM CHLORIDE 9 MG/ML
1000 INJECTION, SOLUTION INTRAVENOUS
Refills: 0 | Status: DISCONTINUED | OUTPATIENT
Start: 2021-08-06 | End: 2021-08-21

## 2021-08-06 RX ORDER — HYDROMORPHONE HYDROCHLORIDE 2 MG/ML
0.5 INJECTION INTRAMUSCULAR; INTRAVENOUS; SUBCUTANEOUS
Refills: 0 | Status: DISCONTINUED | OUTPATIENT
Start: 2021-08-06 | End: 2021-08-06

## 2021-08-06 RX ORDER — KETOROLAC TROMETHAMINE 30 MG/ML
30 SYRINGE (ML) INJECTION ONCE
Refills: 0 | Status: DISCONTINUED | OUTPATIENT
Start: 2021-08-06 | End: 2021-08-06

## 2021-08-06 RX ORDER — ROSUVASTATIN CALCIUM 5 MG/1
0 TABLET ORAL
Qty: 0 | Refills: 0 | DISCHARGE

## 2021-08-06 RX ORDER — ONDANSETRON 8 MG/1
4 TABLET, FILM COATED ORAL ONCE
Refills: 0 | Status: DISCONTINUED | OUTPATIENT
Start: 2021-08-06 | End: 2021-08-21

## 2021-08-06 RX ADMIN — Medication 30 MILLIGRAM(S): at 16:05

## 2021-08-06 RX ADMIN — Medication 1000 MILLIGRAM(S): at 16:05

## 2021-08-06 RX ADMIN — Medication 1000 MILLIGRAM(S): at 15:53

## 2021-08-06 RX ADMIN — SODIUM CHLORIDE 75 MILLILITER(S): 9 INJECTION, SOLUTION INTRAVENOUS at 15:37

## 2021-08-06 RX ADMIN — SODIUM CHLORIDE 75 MILLILITER(S): 9 INJECTION, SOLUTION INTRAVENOUS at 14:53

## 2021-08-06 RX ADMIN — Medication 30 MILLIGRAM(S): at 15:53

## 2021-08-06 NOTE — CHART NOTE - NSCHARTNOTEFT_GEN_A_CORE
PACU ANESTHESIA ADMISSION NOTE      Procedure: Hysteroscopy with dilation and curettage of uterus using MyoSure device      Post op diagnosis:  Abnormal uterine bleeding    Endometrial polyp        ____  Intubated  TV:______       Rate: ______      FiO2: ______    __x__  Patent Airway    __x__  Full return of protective reflexes    __x__  Full recovery from anesthesia / back to baseline status    Vitals  HR: 64  BP: 130/62  RR: 15  O2 Sat: 100%  Temp: 99.7    Mental Status:  __x__ Awake   ___x__ Alert   _____ Drowsy   _____ Sedated    Nausea/Vomiting:  __x__ NO  ______Yes,   See Post - Op Orders          Pain Scale (0-10):  _____    Treatment: ____ None    __x__ See Post - Op/PCA Orders    Post - Operative Fluids:   ____ Oral   __x__ See Post - Op Orders    Plan: Discharge when criteria met:   _x___Home       _____Floor     _____Critical Care   Other:_________________    Comments: Patient had smooth intraoperative event, no anesthesia complication.

## 2021-08-06 NOTE — BRIEF OPERATIVE NOTE - NSICDXBRIEFPREOP_GEN_ALL_CORE_FT
PRE-OP DIAGNOSIS:  Abnormal uterine bleeding 06-Aug-2021 14:41:03  Karlee Cuello   PRE-OP DIAGNOSIS:  Postmenopausal bleeding 09-Aug-2021 10:27:22  Karlee Cuello

## 2021-08-06 NOTE — ASU DISCHARGE PLAN (ADULT/PEDIATRIC) - CARE PROVIDER_API CALL
Cordelia Gentile)  Obstetrics and Gynecology  [address] [place] [address], [address]  [place] [address]  Phone: [phone]  Fax: [phone]  Follow Up Time:

## 2021-08-06 NOTE — ASU DISCHARGE PLAN (ADULT/PEDIATRIC) - ASU DC SPECIAL INSTRUCTIONSFT
PAIN MANAGEMENT:   Alternate Acetaminophen/Tylenol and Ibuprofen/Motrin (if you are eligible). Each of these medications can be taken every six hours. Try to stagger them so that you are taking something for pain every three hours (ex. Take Motrin at 12:00, Tylenol at 3:00, Motrin at 6:00, etc.) to maximize pain relief.  o Dosages       - Tylenol – 500-650 mg every 6 hours as needed       - Motrin/Ibuprofen - 600 mg every 6 hours as needed  o The maximum dose of Tylenol is 3000 mg in 24 hours, the maximum dose of Motrin/ibuprofen is 2400mg in 24 hours  A warm shower or heating pad may also help.    WHAT TO EXPECT AT HOME  -  Do not put anything in the vagina for at least 2 weeks after surgery unless otherwise instructed by your doctor (including tampons, douching, sexual intercourse, etc).  - It is normal to have some vaginal bleeding after surgery that would require the use of a pantiliner.     WHEN TO CALL YOUR DOCTOR:  - Fever (>100.4°F or 38.0°C) or chills  - Severe nausea or persistent vomiting.  - Bright red vaginal bleeding (soaking >1 pad/hour) or foul smelling vaginal drainage.  - Severe pain not relieved with pain medication.  - Pain with urination, cloudy urine, or foul smelling urine.  - Or if you have any other problems or questions.    *** a prescription for doxycycline has been sent to your pharmacy.   *** follow- up with Dr. Gentile in 2 weeks

## 2021-08-06 NOTE — BRIEF OPERATIVE NOTE - NSICDXBRIEFPROCEDURE_GEN_ALL_CORE_FT
PROCEDURES:  Hysteroscopy with dilation and curettage of uterus using MyoSure device 06-Aug-2021 14:40:50  Karlee Cuello

## 2021-08-06 NOTE — BRIEF OPERATIVE NOTE - NSICDXBRIEFPOSTOP_GEN_ALL_CORE_FT
POST-OP DIAGNOSIS:  Endometrial polyp 06-Aug-2021 14:41:21  Karlee Cuello  Abnormal uterine bleeding 06-Aug-2021 14:41:08  Karlee Cuello   POST-OP DIAGNOSIS:  Postmenopausal bleeding 09-Aug-2021 10:27:41  Karlee Cuello  Endometrial polyp 06-Aug-2021 14:41:21  Karlee Cuello

## 2021-08-09 DIAGNOSIS — N95.0 POSTMENOPAUSAL BLEEDING: ICD-10-CM

## 2021-08-09 LAB — SURGICAL PATHOLOGY STUDY: SIGNIFICANT CHANGE UP

## 2021-08-11 DIAGNOSIS — E78.00 PURE HYPERCHOLESTEROLEMIA, UNSPECIFIED: ICD-10-CM

## 2021-08-11 DIAGNOSIS — N84.0 POLYP OF CORPUS UTERI: ICD-10-CM

## 2021-08-11 DIAGNOSIS — Z87.891 PERSONAL HISTORY OF NICOTINE DEPENDENCE: ICD-10-CM

## 2021-08-11 DIAGNOSIS — Z85.3 PERSONAL HISTORY OF MALIGNANT NEOPLASM OF BREAST: ICD-10-CM

## 2021-08-11 DIAGNOSIS — N95.0 POSTMENOPAUSAL BLEEDING: ICD-10-CM

## 2021-08-11 DIAGNOSIS — N85.4 MALPOSITION OF UTERUS: ICD-10-CM

## 2021-08-25 ENCOUNTER — NON-APPOINTMENT (OUTPATIENT)
Age: 61
End: 2021-08-25

## 2021-08-26 ENCOUNTER — RESULT REVIEW (OUTPATIENT)
Age: 61
End: 2021-08-26

## 2021-08-26 ENCOUNTER — APPOINTMENT (OUTPATIENT)
Dept: OBGYN | Facility: CLINIC | Age: 61
End: 2021-08-26
Payer: COMMERCIAL

## 2021-08-26 ENCOUNTER — OUTPATIENT (OUTPATIENT)
Dept: OUTPATIENT SERVICES | Facility: HOSPITAL | Age: 61
LOS: 1 days | Discharge: HOME | End: 2021-08-26
Payer: COMMERCIAL

## 2021-08-26 VITALS
SYSTOLIC BLOOD PRESSURE: 112 MMHG | BODY MASS INDEX: 35.12 KG/M2 | TEMPERATURE: 98 F | DIASTOLIC BLOOD PRESSURE: 77 MMHG | HEIGHT: 61 IN | HEART RATE: 78 BPM | WEIGHT: 186 LBS

## 2021-08-26 DIAGNOSIS — Z12.31 ENCOUNTER FOR SCREENING MAMMOGRAM FOR MALIGNANT NEOPLASM OF BREAST: ICD-10-CM

## 2021-08-26 DIAGNOSIS — Z98.890 OTHER SPECIFIED POSTPROCEDURAL STATES: Chronic | ICD-10-CM

## 2021-08-26 PROCEDURE — 99212 OFFICE O/P EST SF 10 MIN: CPT | Mod: 25

## 2021-08-26 PROCEDURE — 77063 BREAST TOMOSYNTHESIS BI: CPT | Mod: 26

## 2021-08-26 PROCEDURE — 77067 SCR MAMMO BI INCL CAD: CPT | Mod: 26

## 2021-08-26 NOTE — HISTORY OF PRESENT ILLNESS
[FreeTextEntry1] : 59 yo p1 Patient is here for POST-OP visit  D&C hysteroscopy  myosure done 8-6-2021 for removal of uterine polyp\par Path benign polyp\par patient doing well, no vb,  no pain , no fever

## 2022-04-13 ENCOUNTER — APPOINTMENT (OUTPATIENT)
Dept: BREAST CENTER | Facility: CLINIC | Age: 62
End: 2022-04-13
Payer: COMMERCIAL

## 2022-04-13 VITALS
TEMPERATURE: 97.3 F | WEIGHT: 185 LBS | SYSTOLIC BLOOD PRESSURE: 153 MMHG | HEIGHT: 61 IN | DIASTOLIC BLOOD PRESSURE: 93 MMHG | BODY MASS INDEX: 34.93 KG/M2

## 2022-04-13 PROCEDURE — 99213 OFFICE O/P EST LOW 20 MIN: CPT

## 2022-04-13 NOTE — DATA REVIEWED
[FreeTextEntry1] : B/L Screening Mammo - 08/26/2021:\par MAMMOGRAM FINDINGS:\par Mammography was performed including the following views: bilateral craniocaudal with tomosynthesis, bilateral mediolateral oblique with tomosynthesis.  The examination includes digital synthetic 2D and digital tomosynthesis 3D images. Additional imaging analysis was performed using CAD (computer-aided detection) software.\par \par There are scattered areas of fibroglandular density.\par \par Finding 1:  There is an area of architectural distortion at the site of lumpectomy seen in the right breast.\par \par Finding 2:  There are scattered stable asymmetries seen in both breasts.\par \par Finding 3:  There are multiple stable benign masses seen in the left breast.\par \par No suspicious mass, grouping of calcifications, or other abnormality is identified.\par \par There has been no significant change from prior examinations.\par \par IMPRESSION:\par There is no mammographic evidence of malignancy.\par \par RECOMMENDATION:\par Unless otherwise indicated by clinical findings, annual screening mammography recommended.\par \par ASSESSMENT:\par BI-RADS Category 2:  Benign

## 2022-04-13 NOTE — HISTORY OF PRESENT ILLNESS
[FreeTextEntry1] : Patient with Right mod diff IDC/DCIS intermed nuclear grade on NC 7/30/12; 10:00 N4, 2.7 cm.  ER/ME (+).\par Right FC changewith florid ductal hyperplasia, sclerosing adenosis and Ca++ on NC 7/30/12; \par 7-8:00 N3, 1.4 cm.\par Right hyalinized FA and focal acute mastitis on NC 7/30/12; 9:00 N1-2.\par Left complex cyst FNA 7/30/12, aspirated to resolution, 10:00 N3-4.\par s/p Right NLOC/SNB 9/12/12- 1/1 (+) ITC present on final, 2.1 cm mod diff IDC, non extensive DCIS intermed nuclear grade; (-) margins.  FA, Lymphovascular invasion.\par Onctype RS 25.  (+) Chemo - Dr. Delaney.  Completed Anastrozole in May 2020. \par (+) whole breast RT - Dr. Padilla.\par \par \par MAI URIARTE is a 61 year old female patient who presents today in follow up for Stage IIA right breast cancer.\par She offers no new breast related complaints at this time.\par Her most recent imaging was a bilateral screening mammogram done on 08/26/2021, which revealed there are scattered areas of fibroglandular density. There is an area of architectural distortion at the site of lumpectomy seen in the right breast. There are scattered stable asymmetries seen in both breasts. There are multiple stable benign masses seen in the left breast. There has been no significant change from prior examinations. There is no mammographic evidence of malignancy.\par BI-RADS Category 2:  Benign\par \par She presents today for evaluation.

## 2022-04-13 NOTE — ASSESSMENT
[FreeTextEntry1] : MAI is a dana 61 year old patient who presented today in follow up for a history of Stage IIA right breast cancer. \par She has been doing well but over the past 4-6 weeks she has been experiencing sporadic breast pain.\par Her most recent imaging was a bilateral screening mammogram done on 08/26/2021, which revealed there are scattered areas of fibroglandular density. There is an area of architectural distortion at the site of lumpectomy seen in the right breast. There are scattered stable asymmetries seen in both breasts. There are multiple stable benign masses seen in the left breast. There has been no significant change from prior examinations. There is no mammographic evidence of malignancy.\par BI-RADS Category 2:  Benign, as detailed above.\par \par Imaging with a bilateral screening mammogram will be due in August 2022, and that will be scheduled today.\par We will plan to discuss these results via telephone.\par If benign, imaging with bilateral breast MRI (if she would like to continue) will be ordered for February 2023. \par She will then return for follow-up and clinical breast exam in February 2023.\par She will continue follow-up with medical oncology as well.\par \par I spent a total of 20 minutes of face to face time with this patient, greater than 50% of which was spent in counseling and/or coordination of care.\par All of her questions were appropriately answered.\par She knows to call with any concerns.\par \par MAI URIARTE has been enrolled in the breast cancer surgical survivorship care program.\par A copy of her survivorship care plan has been provided to her as well.

## 2022-04-13 NOTE — PHYSICAL EXAM
[Normocephalic] : normocephalic [Atraumatic] : atraumatic [No Supraclavicular Adenopathy] : no supraclavicular adenopathy [No dominant masses] : no dominant masses in right breast  [No dominant masses] : no dominant masses left breast [No Nipple Discharge] : no left nipple discharge [No Rashes] : no rashes [No Ulceration] : no ulceration [Breast Nipple Inversion] : nipples not inverted [Breast Nipple Retraction] : nipples not retracted [de-identified] : well healed surgical scars. \par some palpable scar tissue.  [de-identified] : No axillary lymphadenopathy appreciated. [de-identified] : No axillary lymphadenopathy appreciated.

## 2022-07-20 ENCOUNTER — OUTPATIENT (OUTPATIENT)
Dept: OUTPATIENT SERVICES | Facility: HOSPITAL | Age: 62
LOS: 1 days | Discharge: HOME | End: 2022-07-20

## 2022-07-20 ENCOUNTER — APPOINTMENT (OUTPATIENT)
Dept: HEMATOLOGY ONCOLOGY | Facility: CLINIC | Age: 62
End: 2022-07-20

## 2022-07-20 VITALS
SYSTOLIC BLOOD PRESSURE: 140 MMHG | WEIGHT: 195 LBS | DIASTOLIC BLOOD PRESSURE: 63 MMHG | HEART RATE: 93 BPM | TEMPERATURE: 97.5 F | HEIGHT: 61 IN | BODY MASS INDEX: 36.82 KG/M2

## 2022-07-20 DIAGNOSIS — Z98.890 OTHER SPECIFIED POSTPROCEDURAL STATES: Chronic | ICD-10-CM

## 2022-07-20 PROCEDURE — 99213 OFFICE O/P EST LOW 20 MIN: CPT

## 2022-07-24 NOTE — PHYSICAL EXAM
[Fully active, able to carry on all pre-disease performance without restriction] : Status 0 - Fully active, able to carry on all pre-disease performance without restriction [Normal] : affect appropriate [de-identified] : Status post right breast Lumpectomy. There is no palpable abnormality in either breast and axilla.

## 2022-07-24 NOTE — HISTORY OF PRESENT ILLNESS
[de-identified] : A 57yearold female is here today for a followup visit.  She has a stage IIA pT2 N0M0  invasive ductal carcinoma of the right breast, ER/NV positive, HER2/ananth negative.  She had a right breast lumpectomy and axillary lymph node biopsy in 09/2012.  Her Oncotype recurrence score was 25.  She elected to have adjuvant chemotherapy.  She received Taxotere, cyclophosphamide for four cycles.  After that, she had received a course of adjuvant breast radiotherapy and finished in 03/2013.  She has been taking adjuvant endocrine therapy with anastrozole since 04/2013.  She developed muscular aching and was enrolled in SWOG phase III trial evaluating Duloxetine in women who developed musculoskeletal aching due to an aromatase inhibitor.  She took the medicine for three months and completed study.  \par \par She had last bone density done on 07/29/2016, which showed osteopenia in the lumbar spine.  She is taking calcium and vitamin D. She had Mammogram done on 07/31/2017  It showed stable calcified mass in the left breast for over 2 year.  She scheduled for b/l breast MRI 4/11/2018.  [de-identified] : Today,  she reports feeling well.  She is working full time as a .  She had her bone density done on 07/29/2016, which showed osteopenia in the lumbar spine.  She is taking calcium and vitamin D.\par \par On 5/2/18  she had B/l breast MRI with no suspicious finding .  She scheduled for b/l  breast mammogram on 8/1/18 and will be due for Bone density in 8/1/18 . \par \par The BCI test was done on her previous surgical specimen, which showed high risk of cancer recurrent from 5 to 10 at 6.1%. The study shows low likelihood benefit from extended endocrine therapy.\par  \par on 9/26/18 patient came for scheduled follow up visit, she reported feeling well. She still working as a full time teacher. We  re communicate the extend of the endocrine therapy. also we reviewed Bone density result reveal osteopenia in the Spin region. Patient reported she is more comfortable to continue Endocrine therapy for 2 more year. \par Bone Density: from 8/1/18 \par ----------------------------------------------------------------- \par Region BMD T-score Z-score Classification \par ----------------------------------------------------------------- \par AP Spine (L1-L4) 0.838 -1.9 -0.6 Osteopenia \par Femoral Neck (Left) 0.831 -0.2 1.0 Normal \par Total Hip (Left) 0.928 -0.1 0.7 Normal \par \par Patient had Mammogram on 8/1/18 \par -----------------------------------------------------------------\par No suspicious masses, calcifications or other abnormalities are seen. \par \par When compared to prior studies there is no significant change. \par \par IMPRESSION: \par There is no evidence of malignancy\par \par 3/27/19\par Patient is here today for follow up visit. She takes Anastrozole since 4/2013.  Although she c/o joint pains and weight gain, she prefers to complete 7 years of Anastrozole.  Her latest mammogram on 8/2018 showed no malignancy. Next MRI breasts due on 4/2019. Labs reviewed. Slightly elevated ALT =94.1, Ca=10.2.  Bone density 8/2018 showed Osteopenia.\par \par The BCI test was done on her previous surgical specimen, which showed high risk of cancer recurrent from 5 to 10 at 6.1%. The study shows low likelihood benefit from extended endocrine therapy.\par \par 9/25/19\par Patient is here today for follow up visit. She has been taking Anastrozole since 4/2013. She has chronic arthralgia. She had b/l screening mammo in 8/2019. There was no suspicious finding. Bone density 8/2018 showed Osteopenia. She does not have breast related complains.\par \par 7/6/2020\par 58 yo female is here today for follow up visit for h/o stage IIA ER/MN positive, HER2/ananth negative invasive ductal carcinoma of the right breast.  She completed 7 years of Anastrozole 5/2020.  She offers no new complaints.  Last mammogram was done 8/2019 which showed no evidence of malignancy.  MRI breast done 5/18/2020 which shows no MR evidence of malignancy in either breast. Stable postsurgical changes of the right breast. Last bone density was done 8/2018 which showed osteopenia.  She is taking calcium and vitamin D. Previous labs reviewed and stable.\par Her health was not affected by COVID pandemic; however, her work as  at [employer] is very busy.\par \par 07/07/2021: MAI is here for follow up visit for Stage IIA ER/MN positive, HER2/ananth negative invasive ductal carcinoma of the right breast, status post lumpectomy, sentinel lymph node biopsy, adjuvant chemotherapy, adjuvant breast therapy, radiotherapy, currently on adjuvant endocrine therapy. There is no clinical evidence of recurrent disease.\par She was complaining of intermittent left breast pain. MRI was done in May 2021 which showed no MR evidence of malignancy in either breast with stable postsurgical changes of the right breast. She recently developed new vaginal bleeding which started Monday night. She saw Dr Cardoza today who completed a sonogram and endometrial biopsy. She was noted to have a polyp; which will most likely the cause of her bleeding.\par \par 7/20/22\liz ONEILL is here for follow up visit for Stage IIA ER/MN positive, HER2/ananth negative invasive ductal carcinoma of the right breast, status post lumpectomy, sentinel lymph node biopsy, adjuvant chemotherapy, adjuvant breast therapy, radiotherapy, completed 5 years of adjuvant endocrine therapy in 5/2020. There is no clinical evidence of recurrent disease. She still reports intermittent left breast pain. MRI was done in May 2021 which showed no MR evidence of malignancy in either breast with stable postsurgical changes of the right breast. At last visit she reported new vaginal bleeding, she had polyp removed and vaginal bleeding stopped. Her most recent imaging was a bilateral screening mammogram done on 08/26/2021, which revealed there are scattered areas of fibroglandular density. There is an area of architectural distortion at the site of lumpectomy seen in the right breast. There are scattered stable asymmetries seen in both breasts. There are multiple stable benign masses seen in the left breast. There has been no significant change from prior examinations. There is no mammographic evidence of malignancy.

## 2022-07-25 DIAGNOSIS — D05.11 INTRADUCTAL CARCINOMA IN SITU OF RIGHT BREAST: ICD-10-CM

## 2022-07-25 DIAGNOSIS — C50.411 MALIGNANT NEOPLASM OF UPPER-OUTER QUADRANT OF RIGHT FEMALE BREAST: ICD-10-CM

## 2022-08-30 ENCOUNTER — OUTPATIENT (OUTPATIENT)
Dept: OUTPATIENT SERVICES | Facility: HOSPITAL | Age: 62
LOS: 1 days | Discharge: HOME | End: 2022-08-30

## 2022-08-30 ENCOUNTER — RESULT REVIEW (OUTPATIENT)
Age: 62
End: 2022-08-30

## 2022-08-30 DIAGNOSIS — Z12.31 ENCOUNTER FOR SCREENING MAMMOGRAM FOR MALIGNANT NEOPLASM OF BREAST: ICD-10-CM

## 2022-08-30 DIAGNOSIS — Z98.890 OTHER SPECIFIED POSTPROCEDURAL STATES: Chronic | ICD-10-CM

## 2022-08-30 PROCEDURE — 77063 BREAST TOMOSYNTHESIS BI: CPT | Mod: 26

## 2022-08-30 PROCEDURE — 76641 ULTRASOUND BREAST COMPLETE: CPT | Mod: 26,50

## 2022-08-30 PROCEDURE — 77067 SCR MAMMO BI INCL CAD: CPT | Mod: 26

## 2022-08-31 DIAGNOSIS — M89.9 DISORDER OF BONE, UNSPECIFIED: ICD-10-CM

## 2022-08-31 DIAGNOSIS — Z78.0 ASYMPTOMATIC MENOPAUSAL STATE: ICD-10-CM

## 2022-08-31 DIAGNOSIS — Z13.820 ENCOUNTER FOR SCREENING FOR OSTEOPOROSIS: ICD-10-CM

## 2022-09-07 ENCOUNTER — NON-APPOINTMENT (OUTPATIENT)
Age: 62
End: 2022-09-07

## 2022-10-03 ENCOUNTER — APPOINTMENT (OUTPATIENT)
Dept: BREAST CENTER | Facility: CLINIC | Age: 62
End: 2022-10-03

## 2022-10-03 DIAGNOSIS — N64.4 MASTODYNIA: ICD-10-CM

## 2022-10-03 PROCEDURE — 99213 OFFICE O/P EST LOW 20 MIN: CPT

## 2022-10-03 NOTE — ASSESSMENT
[FreeTextEntry1] : MAI is a dana 61 year old patient who presented today in follow up for a history of Stage IIA right breast cancer. \par \par Her imaging is as follows:\par 08/30/2022 b/l mammo and US\par - scattered areas of fibroglandular density.\par -an area of architectural distortion at the site of lumpectomy seen in the right breast\par BIRADS2\par \par LEFT US:\par -Hypoechoic mass in the left breast, 2:00 axis, 7 to 8 cm from the nipple measures 0.6 x 0.7 x 0.4 cm. This likely corresponds to a mammographically stable, benign mass with internal calcifications--> Sonographic follow-up is recommended to demonstrate stability.\par -Benign cyst is seen in the left breast, 3:00 axis, 6 to 7 cm from the nipple.\par -Heterogeneous hypoechoic mass in the left breast, 4:00 axis, 5 to 6 cm from the nipple measures 0.6 x 0.5 x 0.3 cm. This likely corresponds to a stable benign mass on the mammogram which is\par  unchanged from 2019-->Sonographic follow-up is recommended.\par -Heterogeneous mass in the left breast, 4-5 o'clock axis, 2 to 3 cm from the nipple measures 0.5 x 0.4 x 0.2 cm. This is probably a benign cluster of microcysts-->Sonographic follow-up is recommended to demonstrate stability.\par -Round hypoechoic mass in the left breast, 12:00 axis, 2 cm from the nipple measures 0.5 x 0.2 x 0.5 cm.\par  -Hypoechoic mass in the left breast, 1:00 axis, 3 cm from nipple measures 0.6 x 0.8 x 0.3 cm. These are similar in appearance to the other probably benign masses-->Continued sonographic follow-up is recommended.\par \par RIGHT US:\par -Scarring is seen at the lumpectomy site in the right breast. \par -Benign mass in the right breast, 9:00 axis, 1 to 2 cm from the nipple is unchanged from 2012 measuring up to 1.3 cm.\par BIRADS3\par \par We discussed BIRADS 3 lesions.  These lesions have a 2% chance of harboring malignancy.  There is always an option to obtain a tissue sample with a biopsy to confirm the diagnosis.   The procedure was described in detail including the placement of a tissue marker clip.  The risks of the procedure, including but not limited to bleeding and infection were also explained.  She is not interested in biopsy at this time and agrees to continue with short-term interval imaging.\par \par PLAN:\par -LEFT US on 3/1/23\par - bilateral breast MRI (patient will let us know if she would like to continue)\par -follow up after imaging \par She will continue follow-up with medical oncology as well.\par \par \par All of her questions were appropriately answered.\par She knows to call with any concerns.\par \par MAI URIARTE has been enrolled in the breast cancer surgical survivorship care program.\par A copy of her survivorship care plan has been provided to her as well.

## 2022-10-03 NOTE — HISTORY OF PRESENT ILLNESS
[FreeTextEntry1] : Patient with Right mod diff IDC/DCIS intermed nuclear grade on NC 7/30/12; 10:00 N4, 2.7 cm.  ER/AR (+).\par Right FC changewith florid ductal hyperplasia, sclerosing adenosis and Ca++ on NC 7/30/12; \par 7-8:00 N3, 1.4 cm.\par Right hyalinized FA and focal acute mastitis on NC 7/30/12; 9:00 N1-2.\par Left complex cyst FNA 7/30/12, aspirated to resolution, 10:00 N3-4.\par s/p Right NLOC/SNB 9/12/12- 1/1 (+) ITC present on final, 2.1 cm mod diff IDC, non extensive DCIS intermed nuclear grade; (-) margins.  FA, Lymphovascular invasion.\par Onctype RS 25.  (+) Chemo - Dr. Delaney.  Completed Anastrozole in May 2020. \par (+) whole breast RT - Dr. Padilla.\par \par \par MAI URIARTE is a 61 year old female patient who presents today in follow up for Stage IIA right breast cancer.\par She offers no new breast related complaints at this time.\par Her most recent imaging was a bilateral screening mammogram done on 08/26/2021, which revealed there are scattered areas of fibroglandular density. There is an area of architectural distortion at the site of lumpectomy seen in the right breast. There are scattered stable asymmetries seen in both breasts. There are multiple stable benign masses seen in the left breast. There has been no significant change from prior examinations. There is no mammographic evidence of malignancy.\par BI-RADS Category 2:  Benign\par \par INTERVAL HISTORY 10/3/22\liz Urbina is here for her six months follow up visit. She is here for evaluation of BIRADS3 abnormality \par She has no breast related complaints at this time.  She denies any breast pain, has not palpated any new palpable masses in either breast and denies any nipple discharge or retraction.\par \par Her imaging is as follows:\par 08/30/2022 b/l mammo and US\par - scattered areas of fibroglandular density.\par -an area of architectural distortion at the site of lumpectomy seen in the right breast\par BIRADS2\par \par LEFT US:\par -Hypoechoic mass in the left breast, 2:00 axis, 7 to 8 cm from the nipple measures 0.6 x 0.7 x 0.4 cm. This likely corresponds to a mammographically stable, benign mass with internal calcifications--> Sonographic follow-up is recommended to demonstrate stability.\par -Benign cyst is seen in the left breast, 3:00 axis, 6 to 7 cm from the nipple.\par -Heterogeneous hypoechoic mass in the left breast, 4:00 axis, 5 to 6 cm from the nipple measures 0.6 x 0.5 x 0.3 cm. This likely corresponds to a stable benign mass on the mammogram which is unchanged from 2019-->Sonographic follow-up is recommended.\par -Heterogeneous mass in the left breast, 4-5 o'clock axis, 2 to 3 cm from the nipple measures 0.5 x 0.4 x 0.2 cm. This is probably a benign cluster of microcysts-->Sonographic follow-up is recommended to demonstrate stability.\par -Round hypoechoic mass in the left breast, 12:00 axis, 2 cm from the nipple measures 0.5 x 0.2 x 0.5 cm. -Hypoechoic mass in the left breast, 1:00 axis, 3 cm from nipple measures 0.6 x 0.8 x 0.3 cm. These are similar in appearance to the other probably benign masses-->Continued sonographic follow-up is recommended.\par \par RIGHT US:\par -Scarring is seen at the lumpectomy site in the right breast. \par -Benign mass in the right breast, 9:00 axis, 1 to 2 cm from the nipple is unchanged from 2012 measuring up to 1.3 cm.\par BIRADS3

## 2022-10-03 NOTE — PHYSICAL EXAM
[Normocephalic] : normocephalic [Atraumatic] : atraumatic [No Supraclavicular Adenopathy] : no supraclavicular adenopathy [Examined in the supine and seated position] : examined in the supine and seated position [Symmetrical] : symmetrical [No dominant masses] : no dominant masses in right breast  [No dominant masses] : no dominant masses left breast [No Nipple Retraction] : no left nipple retraction [No Nipple Discharge] : no left nipple discharge [Breast Nipple Inversion] : nipples not inverted [Breast Nipple Retraction] : nipples not retracted [No Axillary Lymphadenopathy] : no left axillary lymphadenopathy [No Edema] : no edema [No Rashes] : no rashes [No Ulceration] : no ulceration [de-identified] : well healed surgical scars. \par some palpable scar tissue.  [de-identified] : No axillary lymphadenopathy appreciated. [de-identified] : No axillary lymphadenopathy appreciated.

## 2022-10-03 NOTE — DATA REVIEWED
[FreeTextEntry1] : ACC: [id number]     EXAM:  MG MAMMO SCREEN W TY BI#\par 08/30/2022\par INTERPRETATION:  HISTORY:\par Bilateral MG MAMMO SCREEN W TY BI# was performed. Patient is 61 years old and is seen for screening.  The patient has a history of bilateral ultrasound guided biopsy at age 51, right lumpectomy at age 51 - malignant and left MRI guided biopsy - benign. The patient has no family history of breast cancer.\par \par RISK ASSESSMENT:\par [place] Lifetime Risk: 8.6\par \par CLINICAL BREAST EXAM:\par The patient reports their last clinical breast exam was performed within the past year.\par \par COMPARISON STUDIES:\par The present examination has been compared to prior imaging studies performed at [facility] on 08/20/2019, 08/25/2020 and 08/26/2021.\par \par MAMMOGRAM FINDINGS:\par Mammography was performed including the following views: bilateral craniocaudal with tomosynthesis, bilateral mediolateral oblique with tomosynthesis.  The examination includes digital synthetic 2D and digital tomosynthesis 3D images. Additional imaging analysis was performed using CAD (computer-aided detection) software.\par \par There are scattered areas of fibroglandular density.\par \par There is an area of architectural distortion at the site of lumpectomy seen in the right breast.\par \par No suspicious mass, grouping of calcifications, or other abnormality is identified.\par \par IMPRESSION:\par There is no mammographic evidence of malignancy.\par \par RECOMMENDATION:\par Unless otherwise indicated by clinical findings, annual screening mammography recommended.\par \par ASSESSMENT:\par BI-RADS Category 2:  Benign\par ACC: [id number]     EXAM:  MG US BREAST COMPLETE BI\par \par PROCEDURE DATE:  08/30/2022\par \par \par \par INTERPRETATION:  CLINICAL HISTORY: History of right breast cancer status post lumpectomy at the age of 51.\par \par COMPARISON: 2012. Mammograms dating back to 2018.\par \par Last clinical breast examination was performed within the past year.\par \par FAMILY HISTORY: None.\par \par TECHNIQUE: Each breast was scanned in its entirety.\par \par \par FINDINGS:\par \par Scarring is seen at the lumpectomy site in the right breast. Benign mass in the right breast, 9:00 axis, 1 to 2 cm from the nipple is unchanged from 2012 measuring up to 1.3 cm. No suspicious masses are seen in the right breast. There is no right axillary adenopathy.\par \par Hypoechoic mass in the left breast, 2:00 axis, 7 to 8 cm from the nipple measures 0.6 x 0.7 x 0.4 cm. This likely corresponds to a mammographically stable, benign mass with internal calcifications. Sonographic follow-up is recommended to demonstrate stability.\par Benign cyst is seen in the left breast, 3:00 axis, 6 to 7 cm from the nipple.\par Heterogeneous hypoechoic mass in the left breast, 4:00 axis, 5 to 6 cm from the nipple measures 0.6 x 0.5 x 0.3 cm. This likely corresponds to a stable benign mass on the mammogram which is unchanged from 2019. Sonographic follow-up is recommended.\par Heterogeneous mass in the left breast, 4-5 o'clock axis, 2 to 3 cm from the nipple measures 0.5 x 0.4 x 0.2 cm. This is probably a benign cluster of microcysts. Sonographic follow-up is recommended to demonstrate stability.\par Round hypoechoic mass in the left breast, 12:00 axis, 2 cm from the nipple measures 0.5 x 0.2 x 0.5 cm. Hypoechoic mass in the left breast, 1:00 axis, 3 cm from nipple measures 0.6 x 0.8 x 0.3 cm. These are similar in appearance to the other probably benign masses. Continued sonographic follow-up is recommended.\par \par There is no left axillary adenopathy.\par \par \par IMPRESSION:\par \par 1. Probably benign left breast masses as above. Sonographic follow-up is recommended in 6 months to demonstrate stability.\par 2. No sonographic evidence of malignancy in the right breast.\par \par Recommendation: Follow-up breast ultrasound in 6 months.\par \par BI-RADS category 3: Probably Benign\par  EXAM:  MR BREAST WAW IC BI\par \par \par PROCEDURE DATE:  05/30/2021\par \par \par \par \par INTERPRETATION:  Clinical History / Reason for exam: Personal history of right breast cancer 2012status post breast conserving therapy. High risk screening.\par \par Technique: Breast MRI is performed at 1.5 T with the patient prone and the breasts in a dedicated breast coil. Following a 3 plane localizer, sagittal T1 weighted, fat-saturated T1 weighted and fat saturated T2-weighted sequence; dynamic contrast enhanced sagittal images; and delayed post-contrast axial fat-saturated T1 weighted images were obtained. 7.5 mL gadolinium contrast was injected and 0 mL was discarded. Subtraction and MIP images were reviewed.  Attolight software was used in interpretation.\par \par Comparison: MRI dated 5/18/2020.\par \par Findings:\par \par Amount of fibroglandular tissue: Scattered fibroglandular tissue\par \par Background parenchymal enhancement: Minimal, Symmetric\par \par RIGHT BREAST:\par No enhancing mass, architectural distortion, or suspicious area of enhancement is identified. Stable postsurgical distortion the upper outer quadrant of prior lumpectomy site.\par \par The nipple and skin appear normal. There is no axillary adenopathy.\par \par LEFT BREAST:\par No enhancing mass, architectural distortion, or suspicious area of enhancement is identified. Stable benign masses are again noted in the left breast.\par \par The nipple and skin appear normal.\par There is no axillary adenopathy.\par \par Slightly increased vascularity is noted in the left sternoclavicular articulation which may reflect evidence of DJD. Please correlate clinically.\par \par \par Impression:\par \par \par No MR evidence of malignancy in either breast. Stable postsurgical changes of the right breast.\par \par Recommendation: Unless otherwise indicated by clinical findings, annual screening mammography recommended. Breast MRI on an alternating 6 month schedule is also recommended in this high risk patient.\par \par BI-RADS Category 2: Benign\par \par

## 2023-03-15 ENCOUNTER — OUTPATIENT (OUTPATIENT)
Dept: OUTPATIENT SERVICES | Facility: HOSPITAL | Age: 63
LOS: 1 days | End: 2023-03-15
Payer: COMMERCIAL

## 2023-03-15 ENCOUNTER — RESULT REVIEW (OUTPATIENT)
Age: 63
End: 2023-03-15

## 2023-03-15 DIAGNOSIS — R92.8 OTHER ABNORMAL AND INCONCLUSIVE FINDINGS ON DIAGNOSTIC IMAGING OF BREAST: ICD-10-CM

## 2023-03-15 DIAGNOSIS — Z98.890 OTHER SPECIFIED POSTPROCEDURAL STATES: Chronic | ICD-10-CM

## 2023-03-15 DIAGNOSIS — Z00.8 ENCOUNTER FOR OTHER GENERAL EXAMINATION: ICD-10-CM

## 2023-03-15 PROCEDURE — 76642 ULTRASOUND BREAST LIMITED: CPT | Mod: LT

## 2023-03-15 PROCEDURE — 76642 ULTRASOUND BREAST LIMITED: CPT | Mod: 26,LT

## 2023-03-16 DIAGNOSIS — R92.8 OTHER ABNORMAL AND INCONCLUSIVE FINDINGS ON DIAGNOSTIC IMAGING OF BREAST: ICD-10-CM

## 2023-03-20 ENCOUNTER — APPOINTMENT (OUTPATIENT)
Dept: BREAST CENTER | Facility: CLINIC | Age: 63
End: 2023-03-20
Payer: COMMERCIAL

## 2023-03-20 VITALS
WEIGHT: 190 LBS | DIASTOLIC BLOOD PRESSURE: 93 MMHG | SYSTOLIC BLOOD PRESSURE: 153 MMHG | HEIGHT: 61 IN | BODY MASS INDEX: 35.87 KG/M2

## 2023-03-20 PROCEDURE — 99212 OFFICE O/P EST SF 10 MIN: CPT

## 2023-03-20 NOTE — ASSESSMENT
[FreeTextEntry1] : MAI is a dana 62 year old patient who presented today in follow up for a history of Stage IIA right breast cancer and BIRADS3 abnormality \par \par Her imaging is as follows:\par 03/15/2023 LEFT US:\par -@1N 3 a stable circumscribed hypoechoic mass measuring 0.7 x 0.6 x 0.3 cm, probably benign.\par -@ 2N 7 to 8  circumscribed hypoechoic mass measuring 0.5 x 0.6 x 0.3 cm, probably benign.\par -@ 3N 6 to 7  benign cyst measuring 0.4 x 0.4 x 0.3 cm.\par -@ 4N 5 to 6  a stable circumscribed hypoechoic mass measuring 0.6 x 0.5 x 0.2 cm, probably benign.\par -@ 4 to 5N 2 to 3 a circumscribed hypoechoic mass measuring 0.5 x 0.4 x 0.2 cm, probably benign.\par -@12N 2  a circumscribed hypoechoic mass measuring 0.4 x 0.5 x 0.3 cm, probably benign.\par BIRADS3\par \par We discussed BIRADS 3 lesions.  These lesions have a 2% chance of harboring malignancy.  There is always an option to obtain a tissue sample with a biopsy to confirm the diagnosis.   The procedure was described in detail including the placement of a tissue marker clip.  The risks of the procedure, including but not limited to bleeding and infection were also explained.  She is not interested in biopsy at this time and agrees to continue with short-term interval imaging.\par \par PLAN:\par -b/l dx mammo and US on August 31, 2023\par - bilateral breast MRI (patient will let us know if she would like to continue)\par -follow up after imaging \par She will continue follow-up with medical oncology as well.\par \par \par All of her questions were appropriately answered.\par She knows to call with any concerns.\par \par MAI URIARTE has been enrolled in the breast cancer surgical survivorship care program.\par A copy of her survivorship care plan has been provided to her as well.

## 2023-03-20 NOTE — PHYSICAL EXAM
[Normocephalic] : normocephalic [Atraumatic] : atraumatic [EOMI] : extra ocular movement intact [Examined in the supine and seated position] : examined in the supine and seated position [Symmetrical] : symmetrical [No dominant masses] : no dominant masses in right breast  [No dominant masses] : no dominant masses left breast [No Nipple Retraction] : no left nipple retraction [No Nipple Discharge] : no left nipple discharge [No Axillary Lymphadenopathy] : no left axillary lymphadenopathy [No Edema] : no edema [No Rashes] : no rashes [No Ulceration] : no ulceration [de-identified] : On physical exam, there are no discrete masses in either breast or axilla. There is no nipple discharge or inversion bilaterally. There are no skin changes bilaterally.\par \par

## 2023-03-20 NOTE — HISTORY OF PRESENT ILLNESS
[FreeTextEntry1] : Patient with Right mod diff IDC/DCIS intermed nuclear grade on NC 7/30/12; 10:00 N4, 2.7 cm.  ER/MO (+).\par Right FC changewith florid ductal hyperplasia, sclerosing adenosis and Ca++ on NC 7/30/12; \par 7-8:00 N3, 1.4 cm.\par Right hyalinized FA and focal acute mastitis on NC 7/30/12; 9:00 N1-2.\par Left complex cyst FNA 7/30/12, aspirated to resolution, 10:00 N3-4.\par s/p Right NLOC/SNB 9/12/12- 1/1 (+) ITC present on final, 2.1 cm mod diff IDC, non extensive DCIS intermed nuclear grade; (-) margins.  FA, Lymphovascular invasion.\par Onctype RS 25.  (+) Chemo - Dr. Delaney.  Completed Anastrozole in May 2020. \par (+) whole breast RT - Dr. Padilla.\par \par \par CIARA URIARTE is a 61 year old female patient who presents today in follow up for Stage IIA right breast cancer.\par She offers no new breast related complaints at this time.\par Her most recent imaging was a bilateral screening mammogram done on 08/26/2021, which revealed there are scattered areas of fibroglandular density. There is an area of architectural distortion at the site of lumpectomy seen in the right breast. There are scattered stable asymmetries seen in both breasts. There are multiple stable benign masses seen in the left breast. There has been no significant change from prior examinations. There is no mammographic evidence of malignancy.\par BI-RADS Category 2:  Benign\par \par INTERVAL HISTORY 10/3/22\liz Urbina is here for her six months follow up visit. She is here for evaluation of BIRADS3 abnormality \par She has no breast related complaints at this time.  She denies any breast pain, has not palpated any new palpable masses in either breast and denies any nipple discharge or retraction.\par \par Her imaging is as follows:\par 08/30/2022 b/l mammo and US\par - scattered areas of fibroglandular density.\par -an area of architectural distortion at the site of lumpectomy seen in the right breast\par BIRADS2\par \par LEFT US:\par -Hypoechoic mass in the left breast, 2:00 axis, 7 to 8 cm from the nipple measures 0.6 x 0.7 x 0.4 cm. This likely corresponds to a mammographically stable, benign mass with internal calcifications--> Sonographic follow-up is recommended to demonstrate stability.\par -Benign cyst is seen in the left breast, 3:00 axis, 6 to 7 cm from the nipple.\par -Heterogeneous hypoechoic mass in the left breast, 4:00 axis, 5 to 6 cm from the nipple measures 0.6 x 0.5 x 0.3 cm. This likely corresponds to a stable benign mass on the mammogram which is unchanged from 2019-->Sonographic follow-up is recommended.\par -Heterogeneous mass in the left breast, 4-5 o'clock axis, 2 to 3 cm from the nipple measures 0.5 x 0.4 x 0.2 cm. This is probably a benign cluster of microcysts-->Sonographic follow-up is recommended to demonstrate stability.\par -Round hypoechoic mass in the left breast, 12:00 axis, 2 cm from the nipple measures 0.5 x 0.2 x 0.5 cm. -Hypoechoic mass in the left breast, 1:00 axis, 3 cm from nipple measures 0.6 x 0.8 x 0.3 cm. These are similar in appearance to the other probably benign masses-->Continued sonographic follow-up is recommended.\par \par RIGHT US:\par -Scarring is seen at the lumpectomy site in the right breast. \par -Benign mass in the right breast, 9:00 axis, 1 to 2 cm from the nipple is unchanged from 2012 measuring up to 1.3 cm.\par BIRADS3\par \par INTERVAL HISTORY 3/20/23\par Ciara is here for her six months follow up visit \par She has no breast related complaints at this time.  She denies any breast pain, has not palpated any new palpable masses in either breast and denies any nipple discharge or retraction.\par \par \par Her imaging is as follows:\par 03/15/2023 LEFT US:\par -@1N 3 a stable circumscribed hypoechoic mass measuring 0.7 x 0.6 x 0.3 cm, probably benign.\par -@ 2N 7 to 8  circumscribed hypoechoic mass measuring 0.5 x 0.6 x 0.3 cm, probably benign.\par -@ 3N 6 to 7  benign cyst measuring 0.4 x 0.4 x 0.3 cm.\par -@ 4N 5 to 6  a stable circumscribed hypoechoic mass measuring 0.6 x 0.5 x 0.2 cm, probably benign.\par -@ 4 to 5N 2 to 3 a circumscribed hypoechoic mass measuring 0.5 x 0.4 x 0.2 cm, probably benign.\par -@12N 2  a circumscribed hypoechoic mass measuring 0.4 x 0.5 x 0.3 cm, probably benign.\par BIRADS3\par \par She would like to omit breast MRI for now but will let me know if changes her mind

## 2023-03-20 NOTE — DATA REVIEWED
[FreeTextEntry1] : ACC: [id number]     EXAM:  MG US BREAST LIMITED LT  \par 03/15/2023\par INTERPRETATION:  History: Short interval follow-up of left breast masses, first identified in August 2022.\par \par The patient reports her last clinical breast examination was performed within the past year.\par \par Family history: None\par \par Comparisons: Breast ultrasound 8/30/2022.\par \par Findings:\par \par Ultrasound:\par \par Targeted unilateral left breast ultrasound was performed.\par \par At the 1:00 position 3 cm from the nipple, there is a stable circumscribed hypoechoic mass measuring 0.7 x 0.6 x 0.3 cm, probably benign.\par \par At the 2:00 position 7 to 8 cm from the nipple, there is a circumscribed hypoechoic mass measuring 0.5 x 0.6 x 0.3 cm, probably benign.\par \par At the 3:00 position 6 to 7 cm from the nipple, there is a benign cyst measuring 0.4 x 0.4 x 0.3 cm.\par \par At the 4:00 position 5 to 6 cm from the nipple, there is a stable circumscribed hypoechoic mass measuring 0.6 x 0.5 x 0.2 cm, probably benign.\par \par At the 4 to 5:00 position 2 to 3 cm from the nipple, there is a circumscribed hypoechoic mass measuring 0.5 x 0.4 x 0.2 cm, probably benign.\par \par At the 12:00 position 2 cm from the nipple, there is a circumscribed hypoechoic mass measuring 0.4 x 0.5 x 0.3 cm, probably benign.\par \par Impression: Stable probably benign left breast masses as above.\par \par Recommendation: Follow-up bilateral diagnostic mammogram and ultrasound in 6 months.\par \par BI-RADS category 3: Probably Benign\par \par

## 2023-03-21 ENCOUNTER — APPOINTMENT (OUTPATIENT)
Dept: OBGYN | Facility: CLINIC | Age: 63
End: 2023-03-21
Payer: COMMERCIAL

## 2023-03-21 VITALS — BODY MASS INDEX: 34.93 KG/M2 | WEIGHT: 185 LBS | HEIGHT: 61 IN

## 2023-03-21 DIAGNOSIS — Z01.419 ENCOUNTER FOR GYNECOLOGICAL EXAMINATION (GENERAL) (ROUTINE) W/OUT ABNORMAL FINDINGS: ICD-10-CM

## 2023-03-21 PROCEDURE — 99396 PREV VISIT EST AGE 40-64: CPT

## 2023-03-21 RX ORDER — ROSUVASTATIN CALCIUM 5 MG/1
5 TABLET, FILM COATED ORAL
Qty: 36 | Refills: 0 | Status: ACTIVE | COMMUNITY
Start: 2022-12-19

## 2023-03-22 NOTE — DISCUSSION/SUMMARY
[FreeTextEntry1] : 61 yo p1 annual exam , personal history of breast cancer\par pap hpv\par colonoscopy advised

## 2023-03-22 NOTE — PHYSICAL EXAM
[Appropriately responsive] : appropriately responsive [Alert] : alert [No Acute Distress] : no acute distress [No Lymphadenopathy] : no lymphadenopathy [Soft] : soft [Non-tender] : non-tender [Non-distended] : non-distended [No HSM] : No HSM [No Lesions] : no lesions [No Mass] : no mass [Oriented x3] : oriented x3 [Labia Minora] : normal [Labia Majora] : normal [No Bleeding] : There was no active vaginal bleeding [Normal] : normal [Uterine Adnexae] : normal [FreeTextEntry6] : Done by breast specialist [Rectocele] : a rectocele

## 2023-03-22 NOTE — HISTORY OF PRESENT ILLNESS
[FreeTextEntry1] : 61 yo P-1 LMP- menopause age 49 is here for annual exam , personal hx breast Ca R Breast lumpectomy 8-2012.\par She is under breast specialist and oncology care dr Delaney , recent breast exam negative.\par She  denies pelvic pain , vaginal bleeding , no bloating.\par Ciara gets up at night to void 2x , same , no pain , not sex active\par \par Patient has history of small left ovarian cyst under 2 cm with negative tumor markers in 2021.\par D&C for uterine polyp benign pathology\par Personal history of breast cancer DCIS after anastrozole that she stopped July 2022 [TextBox_4] : History of small fibroid and small left ovarian cyst\par History of breast cancer dcis\par last anastrazole dose 7/23 [Mammogramdate] : 2022 [ColonoscopyDate] : 2016

## 2023-03-24 LAB
CYTOLOGY CVX/VAG DOC THIN PREP: NORMAL
HPV HIGH+LOW RISK DNA PNL CVX: NOT DETECTED

## 2023-07-19 ENCOUNTER — APPOINTMENT (OUTPATIENT)
Dept: HEMATOLOGY ONCOLOGY | Facility: CLINIC | Age: 63
End: 2023-07-19

## 2023-07-19 ENCOUNTER — APPOINTMENT (OUTPATIENT)
Dept: HEMATOLOGY ONCOLOGY | Facility: CLINIC | Age: 63
End: 2023-07-19
Payer: COMMERCIAL

## 2023-07-19 ENCOUNTER — OUTPATIENT (OUTPATIENT)
Dept: OUTPATIENT SERVICES | Facility: HOSPITAL | Age: 63
LOS: 1 days | End: 2023-07-19
Payer: COMMERCIAL

## 2023-07-19 VITALS
HEART RATE: 78 BPM | DIASTOLIC BLOOD PRESSURE: 85 MMHG | BODY MASS INDEX: 36.06 KG/M2 | HEIGHT: 61 IN | WEIGHT: 191 LBS | TEMPERATURE: 99 F | SYSTOLIC BLOOD PRESSURE: 142 MMHG

## 2023-07-19 DIAGNOSIS — C50.411 MALIGNANT NEOPLASM OF UPPER-OUTER QUADRANT OF RIGHT FEMALE BREAST: ICD-10-CM

## 2023-07-19 DIAGNOSIS — Z98.890 OTHER SPECIFIED POSTPROCEDURAL STATES: Chronic | ICD-10-CM

## 2023-07-19 DIAGNOSIS — M85.80 OTHER SPECIFIED DISORDERS OF BONE DENSITY AND STRUCTURE, UNSPECIFIED SITE: ICD-10-CM

## 2023-07-19 PROCEDURE — 99213 OFFICE O/P EST LOW 20 MIN: CPT

## 2023-07-20 DIAGNOSIS — C50.411 MALIGNANT NEOPLASM OF UPPER-OUTER QUADRANT OF RIGHT FEMALE BREAST: ICD-10-CM

## 2023-07-20 DIAGNOSIS — M85.80 OTHER SPECIFIED DISORDERS OF BONE DENSITY AND STRUCTURE, UNSPECIFIED SITE: ICD-10-CM

## 2023-07-20 NOTE — ASSESSMENT
[FreeTextEntry1] : Stage IIA ER/LA positive, HER2/ananth negative invasive ductal carcinoma of the right breast, status post lumpectomy, sentinel lymph node biopsy, adjuvant chemotherapy, adjuvant breast therapy, radiotherapy, completed 7 years of adjuvant endocrine therapy in May 2020  There is no clinical evidence of recurrent disease.\par Oncotype RS 26.\par \par Assessment and Plan: \par -- Completed Anastrozole 5/2020.\par -- Breast exam today did not reveal palpable abnormality. b/l screening mammo in 8/2022 showed no suspicious finding. Due for bilateral screening mammogram in August 2023. Script given to patient \par -- Osteopenia. Reviewed DEXA from 8/2022. Continue Calcium and vitamin D supplement.  Encourage more physical activities. Referral provided today. \par -- Postmenopausal vaginal bleeding. S/p pelvic sono which showed ovarian cyst and hysterosonogram showed a uterine polyp. Resolved after polypectomy. \par -- Followup with PCP for health maintenance.\par -- Followup in one year.\par \par

## 2023-07-20 NOTE — HISTORY OF PRESENT ILLNESS
[de-identified] : A 57yearold female is here today for a followup visit.  She has a stage IIA pT2 N0M0  invasive ductal carcinoma of the right breast, ER/RI positive, HER2/ananth negative.  She had a right breast lumpectomy and axillary lymph node biopsy in 09/2012.  Her Oncotype recurrence score was 25.  She elected to have adjuvant chemotherapy.  She received Taxotere, cyclophosphamide for four cycles.  After that, she had received a course of adjuvant breast radiotherapy and finished in 03/2013.  She has been taking adjuvant endocrine therapy with anastrozole since 04/2013.  She developed muscular aching and was enrolled in SWOG phase III trial evaluating Duloxetine in women who developed musculoskeletal aching due to an aromatase inhibitor.  She took the medicine for three months and completed study.  \par \par She had last bone density done on 07/29/2016, which showed osteopenia in the lumbar spine.  She is taking calcium and vitamin D. She had Mammogram done on 07/31/2017  It showed stable calcified mass in the left breast for over 2 year.  She scheduled for b/l breast MRI 4/11/2018.  [de-identified] : Today,  she reports feeling well.  She is working full time as a .  She had her bone density done on 07/29/2016, which showed osteopenia in the lumbar spine.  She is taking calcium and vitamin D.\par \par On 5/2/18  she had B/l breast MRI with no suspicious finding .  She scheduled for b/l  breast mammogram on 8/1/18 and will be due for Bone density in 8/1/18 . \par \par The BCI test was done on her previous surgical specimen, which showed high risk of cancer recurrent from 5 to 10 at 6.1%. The study shows low likelihood benefit from extended endocrine therapy.\par  \par on 9/26/18 patient came for scheduled follow up visit, she reported feeling well. She still working as a full time teacher. We  re communicate the extend of the endocrine therapy. also we reviewed Bone density result reveal osteopenia in the Spin region. Patient reported she is more comfortable to continue Endocrine therapy for 2 more year. \par Bone Density: from 8/1/18 \par ----------------------------------------------------------------- \par Region BMD T-score Z-score Classification \par ----------------------------------------------------------------- \par AP Spine (L1-L4) 0.838 -1.9 -0.6 Osteopenia \par Femoral Neck (Left) 0.831 -0.2 1.0 Normal \par Total Hip (Left) 0.928 -0.1 0.7 Normal \par \par Patient had Mammogram on 8/1/18 \par -----------------------------------------------------------------\par No suspicious masses, calcifications or other abnormalities are seen. \par \par When compared to prior studies there is no significant change. \par \par IMPRESSION: \par There is no evidence of malignancy\par \par 3/27/19\par Patient is here today for follow up visit. She takes Anastrozole since 4/2013.  Although she c/o joint pains and weight gain, she prefers to complete 7 years of Anastrozole.  Her latest mammogram on 8/2018 showed no malignancy. Next MRI breasts due on 4/2019. Labs reviewed. Slightly elevated ALT =94.1, Ca=10.2.  Bone density 8/2018 showed Osteopenia.\par \par The BCI test was done on her previous surgical specimen, which showed high risk of cancer recurrent from 5 to 10 at 6.1%. The study shows low likelihood benefit from extended endocrine therapy.\par \par 9/25/19\par Patient is here today for follow up visit. She has been taking Anastrozole since 4/2013. She has chronic arthralgia. She had b/l screening mammo in 8/2019. There was no suspicious finding. Bone density 8/2018 showed Osteopenia. She does not have breast related complains.\par \par 7/6/2020\par 58 yo female is here today for follow up visit for h/o stage IIA ER/MN positive, HER2/ananth negative invasive ductal carcinoma of the right breast.  She completed 7 years of Anastrozole 5/2020.  She offers no new complaints.  Last mammogram was done 8/2019 which showed no evidence of malignancy.  MRI breast done 5/18/2020 which shows no MR evidence of malignancy in either breast. Stable postsurgical changes of the right breast. Last bone density was done 8/2018 which showed osteopenia.  She is taking calcium and vitamin D. Previous labs reviewed and stable.\par Her health was not affected by COVID pandemic; however, her work as  at [employer] is very busy.\par \par 07/07/2021: MAI is here for follow up visit for Stage IIA ER/MN positive, HER2/ananth negative invasive ductal carcinoma of the right breast, status post lumpectomy, sentinel lymph node biopsy, adjuvant chemotherapy, adjuvant breast therapy, radiotherapy, currently on adjuvant endocrine therapy. There is no clinical evidence of recurrent disease.\par She was complaining of intermittent left breast pain. MRI was done in May 2021 which showed no MR evidence of malignancy in either breast with stable postsurgical changes of the right breast. She recently developed new vaginal bleeding which started Monday night. She saw Dr Cardoza today who completed a sonogram and endometrial biopsy. She was noted to have a polyp; which will most likely the cause of her bleeding.\par \par 7/20/22\liz ONEILL is here for follow up visit for Stage IIA ER/MN positive, HER2/ananth negative invasive ductal carcinoma of the right breast, status post lumpectomy, sentinel lymph node biopsy, adjuvant chemotherapy, adjuvant breast therapy, radiotherapy, completed 7 years of adjuvant endocrine therapy in 5/2020. b/l screening mammo in 8/2022 did not show suspicious finding. Sheis feeling well and does no have new complains.

## 2023-07-20 NOTE — PHYSICAL EXAM
[Fully active, able to carry on all pre-disease performance without restriction] : Status 0 - Fully active, able to carry on all pre-disease performance without restriction [Normal] : affect appropriate [de-identified] : Status post right breast Lumpectomy. There is no palpable abnormality in either breast and axilla.

## 2023-07-27 NOTE — ASU PATIENT PROFILE, ADULT - PAIN SCALE PREFERRED, PROFILE
Identified pt with two pt identifiers(name and ). Chief Complaint   Patient presents with    Knee Pain     Patient has had a lot of knee pain in his right leg the past few weeks        Health Maintenance Due   Topic    COVID-19 Vaccine (1)    Varicella vaccine (1 of 2 - 2-dose childhood series)    HPV vaccine (1 - Male 2-dose series)    HIV screen     Hepatitis C screen     DTaP/Tdap/Td vaccine (1 - Tdap)       Wt Readings from Last 3 Encounters:   23 220 lb (99.8 kg)   10/19/22 216 lb 12.8 oz (98.3 kg)     Temp Readings from Last 3 Encounters:   No data found for Temp     BP Readings from Last 3 Encounters:   23 118/61   10/19/22 120/78     Pulse Readings from Last 3 Encounters:   23 81   10/19/22 71           Depression Screening:  :     PHQ-9 Questionaire 2023 10/19/2022   Little interest or pleasure in doing things 0 0   Feeling down, depressed, or hopeless 0 0   PHQ-9 Total Score 0 0        Fall Risk Assessment:  :   No flowsheet data found. Abuse Screening:  :   No flowsheet data found. Coordination of Care Questionnaire:  :     1. \"Have you been to the ER, urgent care clinic since your last visit? Hospitalized since your last visit? \" no    2. \"Have you seen or consulted any other health care providers outside of the [address] [place] [address] since your last visit? \" no     3. This patient is accompanied in the office by his self. 4. For patients aged 43-73: Has the patient had a colonoscopy / FIT/ Cologuard? NA - based on age      If the patient is female:    11. For patients aged 43-66: Has the patient had a mammogram within the past 2 years? NA - based on age or sex      10. For patients aged 21-65: Has the patient had a pap smear?  NA - based on age or sex
SUBJECTIVE:  Rock Mcneal is a 32 y.o. male who sustained a right knee painweek(s) ago. Mechanism of injury: working out excessively at the gym. Immediate symptoms: delayed pain, no deformity was noted by the patient. Symptoms have been intermittent and periodic since that time. Prior history of related problems: no prior problems with this area in the past.    OBJECTIVE:  Vital signs as noted above. Appearance: alert, well appearing, and in no distress, oriented to person, place, and time, and normal appearing weight. Knee exam: normal exam, no swelling, tenderness, instability; ligaments intact, FROM, negative drawer sign, negative pivot-shift, collateral ligaments intact, ligamentous instability medially, negative Eva sign, patellar tenderness, normal ipsilateral hip exam, normal ipsilateral foot and ankle exam, normal contralateral knee exam.  X-ray: not indicated. ASSESSMENT:  Knee strain, sprain, ligament injury, and tendon injury    PLAN:  rest the injured area as much as practical, apply ice packs, elevate the injured limb, compressive bandage  See orders for this visit as documented in the electronic medical record. 1. Patellar tendinitis of right knee  Shara Porter MD   No future appointments.
numerical 0-10

## 2023-09-11 ENCOUNTER — RESULT REVIEW (OUTPATIENT)
Age: 63
End: 2023-09-11

## 2023-09-11 ENCOUNTER — OUTPATIENT (OUTPATIENT)
Dept: OUTPATIENT SERVICES | Facility: HOSPITAL | Age: 63
LOS: 1 days | End: 2023-09-11
Payer: COMMERCIAL

## 2023-09-11 DIAGNOSIS — R92.2 INCONCLUSIVE MAMMOGRAM: ICD-10-CM

## 2023-09-11 DIAGNOSIS — Z12.31 ENCOUNTER FOR SCREENING MAMMOGRAM FOR MALIGNANT NEOPLASM OF BREAST: ICD-10-CM

## 2023-09-11 DIAGNOSIS — Z98.890 OTHER SPECIFIED POSTPROCEDURAL STATES: Chronic | ICD-10-CM

## 2023-09-11 DIAGNOSIS — R92.8 OTHER ABNORMAL AND INCONCLUSIVE FINDINGS ON DIAGNOSTIC IMAGING OF BREAST: ICD-10-CM

## 2023-09-11 PROCEDURE — 76641 ULTRASOUND BREAST COMPLETE: CPT | Mod: 26,50

## 2023-09-11 PROCEDURE — 77066 DX MAMMO INCL CAD BI: CPT

## 2023-09-11 PROCEDURE — G0279: CPT | Mod: 26

## 2023-09-11 PROCEDURE — 76641 ULTRASOUND BREAST COMPLETE: CPT | Mod: 50

## 2023-09-11 PROCEDURE — 77066 DX MAMMO INCL CAD BI: CPT | Mod: 26

## 2023-09-11 PROCEDURE — G0279: CPT

## 2023-09-12 DIAGNOSIS — R92.8 OTHER ABNORMAL AND INCONCLUSIVE FINDINGS ON DIAGNOSTIC IMAGING OF BREAST: ICD-10-CM

## 2023-09-12 DIAGNOSIS — R92.2 INCONCLUSIVE MAMMOGRAM: ICD-10-CM

## 2023-09-18 ENCOUNTER — APPOINTMENT (OUTPATIENT)
Dept: BREAST CENTER | Facility: CLINIC | Age: 63
End: 2023-09-18
Payer: COMMERCIAL

## 2023-09-18 VITALS
SYSTOLIC BLOOD PRESSURE: 142 MMHG | TEMPERATURE: 98.2 F | HEIGHT: 61 IN | BODY MASS INDEX: 35.87 KG/M2 | WEIGHT: 190 LBS | DIASTOLIC BLOOD PRESSURE: 83 MMHG

## 2023-09-18 PROCEDURE — 99212 OFFICE O/P EST SF 10 MIN: CPT

## 2023-10-24 NOTE — HISTORY OF PRESENT ILLNESS
Catheter removed. [FreeTextEntry1] : Patient with Right mod diff IDC/DCIS intermed nuclear grade on NC 7/30/12; 10:00 N4, 2.7 cm.  ER/OK (+).\par Right FC changewith florid ductal hyperplasia, sclerosing adenosis and Ca++ on NC 7/30/12; \par 7-8:00 N3, 1.4 cm.\par Right hyalinized FA and focal acute mastitis on NC 7/30/12; 9:00 N1-2.\par Left complex cyst FNA 7/30/12, aspirated to resolution, 10:00 N3-4.\par s/p Right NLOC/SNB 9/12/12- 1/1 (+) ITC present on final, 2.1 cm mod diff IDC, non extensive DCIS intermed nuclear grade; (-) margins.  FA, Lymphovascular invasion.\par Onctype RS 25.  (+) Chemo - Dr. Delaney.  On Arimidex.  (+) whole breast RT - Dr. Padilla.\par \par MAI URIARTE is a 58 year old female patient who presents today in follow up for right breast cancer.\par Since her last visit, she has no new breast related complaints. \par Imaging was done on 05/15/19, which revealed no MR evidence of malignancy; stable architectural distortion in the right breast upper-outer quadrant at prior lumpectomy site consistent with postsurgical change. \par \par She presents today for evaluation and imaging review.

## 2023-11-09 NOTE — ASU PREOP CHECKLIST - AS BP NONINV SITE
left upper arm normal affect/alert and oriented x3/normal behavior details… Right arm precaution/left upper arm

## 2024-03-26 ENCOUNTER — RESULT REVIEW (OUTPATIENT)
Age: 64
End: 2024-03-26

## 2024-03-26 ENCOUNTER — OUTPATIENT (OUTPATIENT)
Dept: OUTPATIENT SERVICES | Facility: HOSPITAL | Age: 64
LOS: 1 days | End: 2024-03-26
Payer: COMMERCIAL

## 2024-03-26 DIAGNOSIS — Z98.890 OTHER SPECIFIED POSTPROCEDURAL STATES: Chronic | ICD-10-CM

## 2024-03-26 DIAGNOSIS — R92.8 OTHER ABNORMAL AND INCONCLUSIVE FINDINGS ON DIAGNOSTIC IMAGING OF BREAST: ICD-10-CM

## 2024-03-26 PROCEDURE — 76642 ULTRASOUND BREAST LIMITED: CPT | Mod: LT

## 2024-03-26 PROCEDURE — 76642 ULTRASOUND BREAST LIMITED: CPT | Mod: 26,LT

## 2024-03-27 DIAGNOSIS — R92.8 OTHER ABNORMAL AND INCONCLUSIVE FINDINGS ON DIAGNOSTIC IMAGING OF BREAST: ICD-10-CM

## 2024-04-02 ENCOUNTER — APPOINTMENT (OUTPATIENT)
Dept: BREAST CENTER | Facility: CLINIC | Age: 64
End: 2024-04-02
Payer: COMMERCIAL

## 2024-04-02 VITALS
BODY MASS INDEX: 35.87 KG/M2 | WEIGHT: 190 LBS | HEIGHT: 61 IN | DIASTOLIC BLOOD PRESSURE: 80 MMHG | SYSTOLIC BLOOD PRESSURE: 144 MMHG

## 2024-04-02 DIAGNOSIS — D05.11 INTRADUCTAL CARCINOMA IN SITU OF RIGHT BREAST: ICD-10-CM

## 2024-04-02 DIAGNOSIS — C50.411 MALIGNANT NEOPLASM OF UPPER-OUTER QUADRANT OF RIGHT FEMALE BREAST: ICD-10-CM

## 2024-04-02 DIAGNOSIS — R92.8 OTHER ABNORMAL AND INCONCLUSIVE FINDINGS ON DIAGNOSTIC IMAGING OF BREAST: ICD-10-CM

## 2024-04-02 PROCEDURE — 99212 OFFICE O/P EST SF 10 MIN: CPT

## 2024-04-02 NOTE — PAST MEDICAL HISTORY
[Menarche Age ____] : age at menarche was [unfilled] [Total Preg ___] : G[unfilled] [Live Births ___] : P[unfilled]  [Age At Live Birth ___] : Age at live birth: [unfilled] [FreeTextEntry5] : none [History of Hormone Replacement Treatment] : has no history of hormone replacement treatment [FreeTextEntry7] : none [FreeTextEntry6] : none [FreeTextEntry8] : none

## 2024-04-02 NOTE — PHYSICAL EXAM
[Normocephalic] : normocephalic [Atraumatic] : atraumatic [EOMI] : extra ocular movement intact [Examined in the supine and seated position] : examined in the supine and seated position [No dominant masses] : no dominant masses in right breast  [Symmetrical] : symmetrical [No dominant masses] : no dominant masses left breast [No Nipple Retraction] : no left nipple retraction [No Nipple Discharge] : no left nipple discharge [No Axillary Lymphadenopathy] : no left axillary lymphadenopathy [No Edema] : no edema [No Rashes] : no rashes [No Ulceration] : no ulceration [de-identified] : On physical exam, there are no discrete masses in either breast or axilla. There is no nipple discharge or inversion bilaterally. There are no skin changes bilaterally.

## 2024-04-02 NOTE — ASSESSMENT
[FreeTextEntry1] : MAI is a dana 63 year old patient who presented today in follow up for a history of Stage IIA right breast cancer and BIRADS3 abnormality   Her imaging is as follows: 3/26/2024 left us-->birads3 -2:00 axis, 7 to 8 cm from the nipple measuring up to 0.5 cm, stable. -3:00 axis, 6 to 7 cm from the nipple measuring up to 0.4 cm, stable. -4-5 o'clock axis, 2 to 3 cm from the nipple measuring up to 0.4 cm, stable. -12:00 axis, 2 cm from the nipple measuring up to 0.5 cm, stable. -1:00 axis, 3 cm from the nipple measuring up to 0.7 cm, stable.  We discussed BIRADS 3 lesions.  These lesions have a 2% chance of harboring malignancy.  There is always an option to obtain a tissue sample with a biopsy to confirm the diagnosis.   The procedure was described in detail including the placement of a tissue marker clip.  The risks of the procedure, including but not limited to bleeding and infection were also explained.  She is not interested in biopsy at this time and agrees to continue with short-term interval imaging.  PLAN: -b/l dx mammo and US on 9/12/24 -follow up after imaging  She will continue follow-up with medical oncology as well.  All of her questions were appropriately answered. She knows to call with any concerns.  MAI URIARTE has been enrolled in the breast cancer surgical survivorship care program. A copy of her survivorship care plan has been provided to her as well.

## 2024-04-02 NOTE — HISTORY OF PRESENT ILLNESS
Patient was reexamined. Contractions have ceased overnight. She was able to rest comfortably. She is feeling regular fetal movement.     SVE 0/50/-3, posterior, unchanged    Cervix unchanged with contractions ceased and normal cervical length, will discharge home   [FreeTextEntry1] : Patient with Right mod diff IDC/DCIS intermed nuclear grade on NC 7/30/12; 10:00 N4, 2.7 cm.  ER/FL (+). Right FC changewith florid ductal hyperplasia, sclerosing adenosis and Ca++ on NC 7/30/12;  7-8:00 N3, 1.4 cm. Right hyalinized FA and focal acute mastitis on NC 7/30/12; 9:00 N1-2. Left complex cyst FNA 7/30/12, aspirated to resolution, 10:00 N3-4. s/p Right NLOC/SNB 9/12/12- 1/1 (+) ITC present on final, 2.1 cm mod diff IDC, non extensive DCIS intermed nuclear grade; (-) margins.  FA, Lymphovascular invasion. Onctype RS 25.  (+) Chemo - Dr. Delaney.  Completed Anastrozole in May 2020.  (+) whole breast RT - Dr. Padilla.   CIARA URIARTE is a 61 year old female patient who presents today in follow up for Stage IIA right breast cancer. She offers no new breast related complaints at this time. Her most recent imaging was a bilateral screening mammogram done on 08/26/2021, which revealed there are scattered areas of fibroglandular density. There is an area of architectural distortion at the site of lumpectomy seen in the right breast. There are scattered stable asymmetries seen in both breasts. There are multiple stable benign masses seen in the left breast. There has been no significant change from prior examinations. There is no mammographic evidence of malignancy. BI-RADS Category 2:  Benign  INTERVAL HISTORY 10/3/22 Ciara is here for her six months follow up visit. She is here for evaluation of BIRADS3 abnormality  She has no breast related complaints at this time.  She denies any breast pain, has not palpated any new palpable masses in either breast and denies any nipple discharge or retraction.  Her imaging is as follows: 08/30/2022 b/l mammo and US - scattered areas of fibroglandular density. -an area of architectural distortion at the site of lumpectomy seen in the right breast BIRADS2  LEFT US: -Hypoechoic mass in the left breast, 2:00 axis, 7 to 8 cm from the nipple measures 0.6 x 0.7 x 0.4 cm. This likely corresponds to a mammographically stable, benign mass with internal calcifications--> Sonographic follow-up is recommended to demonstrate stability. -Benign cyst is seen in the left breast, 3:00 axis, 6 to 7 cm from the nipple. -Heterogeneous hypoechoic mass in the left breast, 4:00 axis, 5 to 6 cm from the nipple measures 0.6 x 0.5 x 0.3 cm. This likely corresponds to a stable benign mass on the mammogram which is unchanged from 2019-->Sonographic follow-up is recommended. -Heterogeneous mass in the left breast, 4-5 o'clock axis, 2 to 3 cm from the nipple measures 0.5 x 0.4 x 0.2 cm. This is probably a benign cluster of microcysts-->Sonographic follow-up is recommended to demonstrate stability. -Round hypoechoic mass in the left breast, 12:00 axis, 2 cm from the nipple measures 0.5 x 0.2 x 0.5 cm. -Hypoechoic mass in the left breast, 1:00 axis, 3 cm from nipple measures 0.6 x 0.8 x 0.3 cm. These are similar in appearance to the other probably benign masses-->Continued sonographic follow-up is recommended.  RIGHT US: -Scarring is seen at the lumpectomy site in the right breast.  -Benign mass in the right breast, 9:00 axis, 1 to 2 cm from the nipple is unchanged from 2012 measuring up to 1.3 cm. BIRADS3  INTERVAL HISTORY 3/20/23 Ciara is here for her six months follow up visit  She has no breast related complaints at this time.  She denies any breast pain, has not palpated any new palpable masses in either breast and denies any nipple discharge or retraction.   Her imaging is as follows: 03/15/2023 LEFT US: -@1N 3 a stable circumscribed hypoechoic mass measuring 0.7 x 0.6 x 0.3 cm, probably benign. -@ 2N 7 to 8  circumscribed hypoechoic mass measuring 0.5 x 0.6 x 0.3 cm, probably benign. -@ 3N 6 to 7  benign cyst measuring 0.4 x 0.4 x 0.3 cm. -@ 4N 5 to 6  a stable circumscribed hypoechoic mass measuring 0.6 x 0.5 x 0.2 cm, probably benign. -@ 4 to 5N 2 to 3 a circumscribed hypoechoic mass measuring 0.5 x 0.4 x 0.2 cm, probably benign. -@12N 2  a circumscribed hypoechoic mass measuring 0.4 x 0.5 x 0.3 cm, probably benign. BIRADS3  She would like to omit breast MRI for now but will let me know if changes her mind   INTERVAL HISTORY 9/18/23 Ciara is here for her 6 months follow up visit  She has no breast related complaints at this time.  She denies any breast pain, has not palpated any new palpable masses in either breast and denies any nipple discharge or retraction.  Her imaging is as follows: 09/11/2023 b/l dx mammo and US -scattered areas of fibroglandular density. -Stable post surgical changes noted in the right breast.  Left US: -@ 2:00 N 7-8 there is a stable hypoechoic mass measuring 0.5 x 0.5 x 0.3 cm. Short interval follow-up recommended. -@3:00 N 6-7 there is a stable hypoechoic mass measuring 0.4 x 0.3 x 0.4 cm. Short interval follow-up recommended. -@4-5 o'clock N2 to 3 there is a stable hypoechoic mass measuring 0.4 x 0.4 x 0.3 cm. Short interval follow-up recommended. -@1:00 N3 there is a stable hypoechoic mass measuring 0.7 x 0.6 x 0.3 cm. Short interval follow-up recommended. The prior noted mass at 12:00 N3 is not visualized on current study likely on the basis of fluctuating cyst.  RightUS; - stable previously biopsied benign mass at 9:00 N 1-2 measuring 1.1 x 1.4 x 0.5 cm. BIRADS3   INTERVAL HISTORY 4/2/24 Ciara is here for her 6 months follow up visit  She has no breast related complaints at this time.  She denies any breast pain, has not palpated any new palpable masses in either breast and denies any nipple discharge or retraction.  Her imaging is as follows: 3/26/2024 left us-->birads3 -2:00 axis, 7 to 8 cm from the nipple measuring up to 0.5 cm, stable. -3:00 axis, 6 to 7 cm from the nipple measuring up to 0.4 cm, stable. -4-5 o'clock axis, 2 to 3 cm from the nipple measuring up to 0.4 cm, stable. -12:00 axis, 2 cm from the nipple measuring up to 0.5 cm, stable. -1:00 axis, 3 cm from the nipple measuring up to 0.7 cm, stable.

## 2024-04-02 NOTE — DATA REVIEWED
[FreeTextEntry1] : ACC: [id number]     EXAM:  MG US BREAST LIMITED LT   ORDERED BY: ADRIANO JOHNSON  PROCEDURE DATE:  03/26/2024    INTERPRETATION:  CLINICAL HISTORY: Short interval follow-up for probably benign left breast masses, first identified on 8/30/2022.  COMPARISONS: Ultrasounds dating back to 8/30/2022.  The patient reports her last clinical breast examination was performed within the past year.  FAMILY HISTORY: None.  TECHNIQUE: Targeted left breast ultrasound was performed.   FINDINGS:  Multiple, similar-appearing masses in the left breast are stable from 8/30/2022 and are probably benign. They are as follows: -2:00 axis, 7 to 8 cm from the nipple measuring up to 0.5 cm, stable. -3:00 axis, 6 to 7 cm from the nipple measuring up to 0.4 cm, stable. -4-5 o'clock axis, 2 to 3 cm from the nipple measuring up to 0.4 cm, stable. -12:00 axis, 2 cm from the nipple measuring up to 0.5 cm, stable. -1:00 axis, 3 cm from the nipple measuring up to 0.7 cm, stable.   IMPRESSION:  Probably benign left breast masses as above.  Recommendation: Follow-up bilateral diagnostic mammogram and ultrasound in 6 months.   BI-RADS category 3: Probably Benign

## 2024-07-17 ENCOUNTER — APPOINTMENT (OUTPATIENT)
Dept: HEMATOLOGY ONCOLOGY | Facility: CLINIC | Age: 64
End: 2024-07-17

## 2024-09-12 ENCOUNTER — OUTPATIENT (OUTPATIENT)
Dept: OUTPATIENT SERVICES | Facility: HOSPITAL | Age: 64
LOS: 1 days | End: 2024-09-12
Payer: COMMERCIAL

## 2024-09-12 ENCOUNTER — RESULT REVIEW (OUTPATIENT)
Age: 64
End: 2024-09-12

## 2024-09-12 DIAGNOSIS — D05.11 INTRADUCTAL CARCINOMA IN SITU OF RIGHT BREAST: ICD-10-CM

## 2024-09-12 DIAGNOSIS — Z98.890 OTHER SPECIFIED POSTPROCEDURAL STATES: Chronic | ICD-10-CM

## 2024-09-12 DIAGNOSIS — C50.411 MALIGNANT NEOPLASM OF UPPER-OUTER QUADRANT OF RIGHT FEMALE BREAST: ICD-10-CM

## 2024-09-12 PROCEDURE — 77066 DX MAMMO INCL CAD BI: CPT

## 2024-09-12 PROCEDURE — G0279: CPT

## 2024-09-12 PROCEDURE — 76642 ULTRASOUND BREAST LIMITED: CPT | Mod: LT

## 2024-09-12 PROCEDURE — 77066 DX MAMMO INCL CAD BI: CPT | Mod: 26

## 2024-09-12 PROCEDURE — 76642 ULTRASOUND BREAST LIMITED: CPT | Mod: 26,LT

## 2024-09-12 PROCEDURE — G0279: CPT | Mod: 26

## 2024-09-13 DIAGNOSIS — D05.11 INTRADUCTAL CARCINOMA IN SITU OF RIGHT BREAST: ICD-10-CM

## 2024-09-13 DIAGNOSIS — C50.411 MALIGNANT NEOPLASM OF UPPER-OUTER QUADRANT OF RIGHT FEMALE BREAST: ICD-10-CM

## 2024-09-17 ENCOUNTER — APPOINTMENT (OUTPATIENT)
Dept: BREAST CENTER | Facility: CLINIC | Age: 64
End: 2024-09-17
Payer: COMMERCIAL

## 2024-09-17 DIAGNOSIS — D05.11 INTRADUCTAL CARCINOMA IN SITU OF RIGHT BREAST: ICD-10-CM

## 2024-09-17 DIAGNOSIS — R92.8 OTHER ABNORMAL AND INCONCLUSIVE FINDINGS ON DIAGNOSTIC IMAGING OF BREAST: ICD-10-CM

## 2024-09-17 DIAGNOSIS — C50.411 MALIGNANT NEOPLASM OF UPPER-OUTER QUADRANT OF RIGHT FEMALE BREAST: ICD-10-CM

## 2024-09-17 PROCEDURE — 99213 OFFICE O/P EST LOW 20 MIN: CPT

## 2024-09-17 NOTE — DATA REVIEWED
[FreeTextEntry1] : C: [id number]     EXAM:  MG US BREAST LIMITED LT   ORDERED BY: ADRIANO JOHNSON  ACC: [id number]     EXAM:  MG MAMMO DIAG W TY BI#   ORDERED BY: ADRIANO JOHNSON  PROCEDURE DATE:  09/12/2024    INTERPRETATION:  Clinical history: Follow-up left breast masses since August 2022.  The patient reports last clinical breast examination was performed about: Within the past year.  Family history of breast cancer: There is no family history of breast cancer.  Comparisons: Mammograms dating back to 2021.  Views obtained: bilateral full field digital 2D and digital tomosynthesis images .  Computer-aided detection was utilized in the interpretation of this examination.  Breast composition: There are scattered areas of fibroglandular density.  Findings:  Mammogram:  No suspicious mass, microcalcifications or areas of architectural distortion seen in either breast. Stable postsurgical changes noted in the right breast.  Ultrasound:  Targeted unilateral left breast ultrasound was performed.  At 12:00 N2 there is a stable hypoechoic mass measuring 0.5 x 0.5 x 0.3 cm. At 1:00 N3 there is a stable hypoechoic mass measuring 0.7 x 0.5 x 0.3 cm. At 4-5 o'clock N2-3 there is a stable hypoechoic mass measuring 0.5 x 0.4 x 0.2 cm. At 3:00 N 6-7 there is a stable hypoechoic mass measuring 0.4 x 0.4 x 0.3 cm. At 2:00 N 7-8 there is a stable hypoechoic mass measuring 0.4 x 0.4 x 0.2 cm. The above masses have been stable since at least 2022 suggesting benign etiology.  Impression: No mammographic or sonographic evidence of malignancy. Stable left breast masses as above.  Recommendation: Unless otherwise indicated by clinical findings, annual screening mammography recommended.  BI-RADS Category 2: Benign

## 2024-09-17 NOTE — PHYSICAL EXAM
[Normocephalic] : normocephalic [Atraumatic] : atraumatic [EOMI] : extra ocular movement intact [Examined in the supine and seated position] : examined in the supine and seated position [Symmetrical] : symmetrical [No dominant masses] : no dominant masses in right breast  [No dominant masses] : no dominant masses left breast [No Nipple Retraction] : no left nipple retraction [No Nipple Discharge] : no left nipple discharge [No Axillary Lymphadenopathy] : no left axillary lymphadenopathy [No Edema] : no edema [No Rashes] : no rashes [No Ulceration] : no ulceration [de-identified] : On physical exam, there are no discrete masses in either breast or axilla. There is no nipple discharge or inversion bilaterally. There are no skin changes bilaterally.

## 2024-09-17 NOTE — HISTORY OF PRESENT ILLNESS
[FreeTextEntry1] : Patient with Right mod diff IDC/DCIS intermed nuclear grade on NC 7/30/12; 10:00 N4, 2.7 cm.  ER/MT (+). Right FC changewith florid ductal hyperplasia, sclerosing adenosis and Ca++ on NC 7/30/12;  7-8:00 N3, 1.4 cm. Right hyalinized FA and focal acute mastitis on NC 7/30/12; 9:00 N1-2. Left complex cyst FNA 7/30/12, aspirated to resolution, 10:00 N3-4. s/p Right NLOC/SNB 9/12/12- 1/1 (+) ITC present on final, 2.1 cm mod diff IDC, non extensive DCIS intermed nuclear grade; (-) margins.  FA, Lymphovascular invasion. Onctype RS 25.  (+) Chemo - Dr. Delaney.  Completed Anastrozole in May 2020.  (+) whole breast RT - Dr. Padilla.   CIARA URIARTE is a 61 year old female patient who presents today in follow up for Stage IIA right breast cancer. She offers no new breast related complaints at this time. Her most recent imaging was a bilateral screening mammogram done on 08/26/2021, which revealed there are scattered areas of fibroglandular density. There is an area of architectural distortion at the site of lumpectomy seen in the right breast. There are scattered stable asymmetries seen in both breasts. There are multiple stable benign masses seen in the left breast. There has been no significant change from prior examinations. There is no mammographic evidence of malignancy. BI-RADS Category 2:  Benign  INTERVAL HISTORY 10/3/22 Ciara is here for her six months follow up visit. She is here for evaluation of BIRADS3 abnormality  She has no breast related complaints at this time.  She denies any breast pain, has not palpated any new palpable masses in either breast and denies any nipple discharge or retraction.  Her imaging is as follows: 08/30/2022 b/l mammo and US - scattered areas of fibroglandular density. -an area of architectural distortion at the site of lumpectomy seen in the right breast BIRADS2  LEFT US: -Hypoechoic mass in the left breast, 2:00 axis, 7 to 8 cm from the nipple measures 0.6 x 0.7 x 0.4 cm. This likely corresponds to a mammographically stable, benign mass with internal calcifications--> Sonographic follow-up is recommended to demonstrate stability. -Benign cyst is seen in the left breast, 3:00 axis, 6 to 7 cm from the nipple. -Heterogeneous hypoechoic mass in the left breast, 4:00 axis, 5 to 6 cm from the nipple measures 0.6 x 0.5 x 0.3 cm. This likely corresponds to a stable benign mass on the mammogram which is unchanged from 2019-->Sonographic follow-up is recommended. -Heterogeneous mass in the left breast, 4-5 o'clock axis, 2 to 3 cm from the nipple measures 0.5 x 0.4 x 0.2 cm. This is probably a benign cluster of microcysts-->Sonographic follow-up is recommended to demonstrate stability. -Round hypoechoic mass in the left breast, 12:00 axis, 2 cm from the nipple measures 0.5 x 0.2 x 0.5 cm. -Hypoechoic mass in the left breast, 1:00 axis, 3 cm from nipple measures 0.6 x 0.8 x 0.3 cm. These are similar in appearance to the other probably benign masses-->Continued sonographic follow-up is recommended.  RIGHT US: -Scarring is seen at the lumpectomy site in the right breast.  -Benign mass in the right breast, 9:00 axis, 1 to 2 cm from the nipple is unchanged from 2012 measuring up to 1.3 cm. BIRADS3  INTERVAL HISTORY 3/20/23 Ciara is here for her six months follow up visit  She has no breast related complaints at this time.  She denies any breast pain, has not palpated any new palpable masses in either breast and denies any nipple discharge or retraction.   Her imaging is as follows: 03/15/2023 LEFT US: -@1N 3 a stable circumscribed hypoechoic mass measuring 0.7 x 0.6 x 0.3 cm, probably benign. -@ 2N 7 to 8  circumscribed hypoechoic mass measuring 0.5 x 0.6 x 0.3 cm, probably benign. -@ 3N 6 to 7  benign cyst measuring 0.4 x 0.4 x 0.3 cm. -@ 4N 5 to 6  a stable circumscribed hypoechoic mass measuring 0.6 x 0.5 x 0.2 cm, probably benign. -@ 4 to 5N 2 to 3 a circumscribed hypoechoic mass measuring 0.5 x 0.4 x 0.2 cm, probably benign. -@12N 2  a circumscribed hypoechoic mass measuring 0.4 x 0.5 x 0.3 cm, probably benign. BIRADS3  She would like to omit breast MRI for now but will let me know if changes her mind   INTERVAL HISTORY 9/18/23 Ciara is here for her 6 months follow up visit  She has no breast related complaints at this time.  She denies any breast pain, has not palpated any new palpable masses in either breast and denies any nipple discharge or retraction.  Her imaging is as follows: 09/11/2023 b/l dx mammo and US -scattered areas of fibroglandular density. -Stable post surgical changes noted in the right breast.  Left US: -@ 2:00 N 7-8 there is a stable hypoechoic mass measuring 0.5 x 0.5 x 0.3 cm. Short interval follow-up recommended. -@3:00 N 6-7 there is a stable hypoechoic mass measuring 0.4 x 0.3 x 0.4 cm. Short interval follow-up recommended. -@4-5 o'clock N2 to 3 there is a stable hypoechoic mass measuring 0.4 x 0.4 x 0.3 cm. Short interval follow-up recommended. -@1:00 N3 there is a stable hypoechoic mass measuring 0.7 x 0.6 x 0.3 cm. Short interval follow-up recommended. The prior noted mass at 12:00 N3 is not visualized on current study likely on the basis of fluctuating cyst.  RightUS; - stable previously biopsied benign mass at 9:00 N 1-2 measuring 1.1 x 1.4 x 0.5 cm. BIRADS3   INTERVAL HISTORY 4/2/24 Ciara is here for her 6 months follow up visit  She has no breast related complaints at this time.  She denies any breast pain, has not palpated any new palpable masses in either breast and denies any nipple discharge or retraction.  Her imaging is as follows: 3/26/2024 left us-->birads3 -2:00 axis, 7 to 8 cm from the nipple measuring up to 0.5 cm, stable. -3:00 axis, 6 to 7 cm from the nipple measuring up to 0.4 cm, stable. -4-5 o'clock axis, 2 to 3 cm from the nipple measuring up to 0.4 cm, stable. -12:00 axis, 2 cm from the nipple measuring up to 0.5 cm, stable. -1:00 axis, 3 cm from the nipple measuring up to 0.7 cm, stable.   INTERVAL HISTORY 9/17/24 Ciara is here for her 6 months follow up visit She has no breast related complaints at this time.  She denies any breast pain, has not palpated any new palpable masses in either breast and denies any nipple discharge or retraction.  Her imaging is as follows: 09/12/2024 b/l dx mammo and LEFT US -->birads2 scattered areas of fibroglandular density. Stable postsurgical changes noted in the right breast.  LEFT US: -@12:00 N2 there is a stable hypoechoic mass measuring 0.5 x 0.5 x 0.3 cm. -@ 1:00 N3 there is a stable hypoechoic mass measuring 0.7 x 0.5 x 0.3 cm. -@4-5 o'clock N2-3 there is a stable hypoechoic mass measuring 0.5 x 0.4 x 0.2 cm. At 3:00 N 6-7 there is a stable hypoechoic mass measuring 0.4 x 0.4 x 0.3 cm. At 2:00 N 7-8 there is a stable hypoechoic mass measuring 0.4 x 0.4 x 0.2 cm. The above masses have been stable since at least 2022 suggesting benign etiology.

## 2024-09-17 NOTE — ASSESSMENT
[FreeTextEntry1] : MAI is a dana 63 year old patient who presented today in follow up for a history of Stage IIA right breast cancer and BIRADS3 abnormality now deemed BIRADS2  Her imaging is as follows: 09/12/2024 b/l dx mammo and LEFT US -->birads2 scattered areas of fibroglandular density. Stable postsurgical changes noted in the right breast.  LEFT US: -@12:00 N2 there is a stable hypoechoic mass measuring 0.5 x 0.5 x 0.3 cm. -@ 1:00 N3 there is a stable hypoechoic mass measuring 0.7 x 0.5 x 0.3 cm. -@4-5 o'clock N2-3 there is a stable hypoechoic mass measuring 0.5 x 0.4 x 0.2 cm. At 3:00 N 6-7 there is a stable hypoechoic mass measuring 0.4 x 0.4 x 0.3 cm. At 2:00 N 7-8 there is a stable hypoechoic mass measuring 0.4 x 0.4 x 0.2 cm. The above masses have been stable since at least 2022 suggesting benign etiology.  We discussed BIRADS 3 lesions.  These lesions have a 2% chance of harboring malignancy.  There is always an option to obtain a tissue sample with a biopsy to confirm the diagnosis.   The procedure was described in detail including the placement of a tissue marker clip.  The risks of the procedure, including but not limited to bleeding and infection were also explained.  She is not interested in biopsy at this time and agrees to continue with short-term interval imaging.  PLAN: -b/l mammo on 9/13/25 -follow up after imaging  She will continue follow-up with medical oncology as well.  All of her questions were appropriately answered. She knows to call with any concerns.  MAI URIARTE has been enrolled in the breast cancer surgical survivorship care program. A copy of her survivorship care plan has been provided to her as well.

## 2024-12-13 NOTE — ASU PATIENT PROFILE, ADULT - TEACHING/LEARNING LEARNING PREFERENCES
Keep left eye patch on for 24 hours. Use erythromycin ointment as ordered. Follow eye doctor in three days for re-evaluation. Return to the emergency department for new or worsening symptoms.  
verbal instruction

## 2025-03-13 ENCOUNTER — APPOINTMENT (OUTPATIENT)
Dept: OBGYN | Facility: CLINIC | Age: 65
End: 2025-03-13
Payer: COMMERCIAL

## 2025-03-13 ENCOUNTER — APPOINTMENT (OUTPATIENT)
Dept: OBGYN | Facility: CLINIC | Age: 65
End: 2025-03-13

## 2025-03-13 ENCOUNTER — NON-APPOINTMENT (OUTPATIENT)
Age: 65
End: 2025-03-13

## 2025-03-13 VITALS
HEIGHT: 61 IN | BODY MASS INDEX: 35.87 KG/M2 | HEART RATE: 81 BPM | DIASTOLIC BLOOD PRESSURE: 82 MMHG | WEIGHT: 190 LBS | SYSTOLIC BLOOD PRESSURE: 138 MMHG

## 2025-03-13 DIAGNOSIS — D25.9 LEIOMYOMA OF UTERUS, UNSPECIFIED: ICD-10-CM

## 2025-03-13 DIAGNOSIS — N95.0 POSTMENOPAUSAL BLEEDING: ICD-10-CM

## 2025-03-13 PROCEDURE — 99213 OFFICE O/P EST LOW 20 MIN: CPT | Mod: 25

## 2025-03-13 PROCEDURE — 76830 TRANSVAGINAL US NON-OB: CPT

## 2025-03-13 PROCEDURE — 58100 BIOPSY OF UTERUS LINING: CPT

## 2025-03-17 ENCOUNTER — NON-APPOINTMENT (OUTPATIENT)
Age: 65
End: 2025-03-17

## 2025-03-17 LAB — CORE LAB BIOPSY: NORMAL

## 2025-06-12 ENCOUNTER — APPOINTMENT (OUTPATIENT)
Dept: OBGYN | Facility: CLINIC | Age: 65
End: 2025-06-12
Payer: COMMERCIAL

## 2025-06-12 PROCEDURE — 99213 OFFICE O/P EST LOW 20 MIN: CPT | Mod: 25

## 2025-06-12 PROCEDURE — 76830 TRANSVAGINAL US NON-OB: CPT

## 2025-06-13 LAB
CANCER AG125 SERPL-ACNC: 9 U/ML
CEA SERPL-MCNC: 0.8 NG/ML

## 2025-07-18 ENCOUNTER — OUTPATIENT (OUTPATIENT)
Dept: OUTPATIENT SERVICES | Facility: HOSPITAL | Age: 65
LOS: 1 days | End: 2025-07-18
Payer: COMMERCIAL

## 2025-07-18 VITALS
WEIGHT: 195.11 LBS | OXYGEN SATURATION: 98 % | HEART RATE: 89 BPM | TEMPERATURE: 99 F | HEIGHT: 61 IN | RESPIRATION RATE: 16 BRPM | DIASTOLIC BLOOD PRESSURE: 83 MMHG | SYSTOLIC BLOOD PRESSURE: 122 MMHG

## 2025-07-18 DIAGNOSIS — D25.9 LEIOMYOMA OF UTERUS, UNSPECIFIED: ICD-10-CM

## 2025-07-18 DIAGNOSIS — Z01.818 ENCOUNTER FOR OTHER PREPROCEDURAL EXAMINATION: ICD-10-CM

## 2025-07-18 DIAGNOSIS — Z98.890 OTHER SPECIFIED POSTPROCEDURAL STATES: Chronic | ICD-10-CM

## 2025-07-18 LAB
ALBUMIN SERPL ELPH-MCNC: 4.7 G/DL — SIGNIFICANT CHANGE UP (ref 3.5–5.2)
ALP SERPL-CCNC: 75 U/L — SIGNIFICANT CHANGE UP (ref 30–115)
ALT FLD-CCNC: 64 U/L — HIGH (ref 0–41)
ANION GAP SERPL CALC-SCNC: 13 MMOL/L — SIGNIFICANT CHANGE UP (ref 7–14)
AST SERPL-CCNC: 49 U/L — HIGH (ref 0–41)
BASOPHILS # BLD AUTO: 0.06 K/UL — SIGNIFICANT CHANGE UP (ref 0–0.2)
BASOPHILS NFR BLD AUTO: 0.7 % — SIGNIFICANT CHANGE UP (ref 0–1)
BILIRUB SERPL-MCNC: 0.4 MG/DL — SIGNIFICANT CHANGE UP (ref 0.2–1.2)
BUN SERPL-MCNC: 22 MG/DL — HIGH (ref 10–20)
CALCIUM SERPL-MCNC: 9.8 MG/DL — SIGNIFICANT CHANGE UP (ref 8.4–10.5)
CHLORIDE SERPL-SCNC: 103 MMOL/L — SIGNIFICANT CHANGE UP (ref 98–110)
CO2 SERPL-SCNC: 24 MMOL/L — SIGNIFICANT CHANGE UP (ref 17–32)
CREAT SERPL-MCNC: 1 MG/DL — SIGNIFICANT CHANGE UP (ref 0.7–1.5)
EGFR: 63 ML/MIN/1.73M2 — SIGNIFICANT CHANGE UP
EGFR: 63 ML/MIN/1.73M2 — SIGNIFICANT CHANGE UP
EOSINOPHIL # BLD AUTO: 0.2 K/UL — SIGNIFICANT CHANGE UP (ref 0–0.7)
EOSINOPHIL NFR BLD AUTO: 2.2 % — SIGNIFICANT CHANGE UP (ref 0–8)
GLUCOSE SERPL-MCNC: 151 MG/DL — HIGH (ref 70–99)
HCT VFR BLD CALC: 41.4 % — SIGNIFICANT CHANGE UP (ref 37–47)
HGB BLD-MCNC: 13.9 G/DL — SIGNIFICANT CHANGE UP (ref 12–16)
IMM GRANULOCYTES NFR BLD AUTO: 0.4 % — HIGH (ref 0.1–0.3)
LYMPHOCYTES # BLD AUTO: 2.31 K/UL — SIGNIFICANT CHANGE UP (ref 1.2–3.4)
LYMPHOCYTES # BLD AUTO: 25.7 % — SIGNIFICANT CHANGE UP (ref 20.5–51.1)
MCHC RBC-ENTMCNC: 30.2 PG — SIGNIFICANT CHANGE UP (ref 27–31)
MCHC RBC-ENTMCNC: 33.6 G/DL — SIGNIFICANT CHANGE UP (ref 32–37)
MCV RBC AUTO: 89.8 FL — SIGNIFICANT CHANGE UP (ref 81–99)
MONOCYTES # BLD AUTO: 0.54 K/UL — SIGNIFICANT CHANGE UP (ref 0.1–0.6)
MONOCYTES NFR BLD AUTO: 6 % — SIGNIFICANT CHANGE UP (ref 1.7–9.3)
NEUTROPHILS # BLD AUTO: 5.83 K/UL — SIGNIFICANT CHANGE UP (ref 1.4–6.5)
NEUTROPHILS NFR BLD AUTO: 65 % — SIGNIFICANT CHANGE UP (ref 42.2–75.2)
NRBC BLD AUTO-RTO: 0 /100 WBCS — SIGNIFICANT CHANGE UP (ref 0–0)
PLATELET # BLD AUTO: 236 K/UL — SIGNIFICANT CHANGE UP (ref 130–400)
PMV BLD: 10 FL — SIGNIFICANT CHANGE UP (ref 7.4–10.4)
POTASSIUM SERPL-MCNC: 4.1 MMOL/L — SIGNIFICANT CHANGE UP (ref 3.5–5)
POTASSIUM SERPL-SCNC: 4.1 MMOL/L — SIGNIFICANT CHANGE UP (ref 3.5–5)
PROT SERPL-MCNC: 6.9 G/DL — SIGNIFICANT CHANGE UP (ref 6–8)
RBC # BLD: 4.61 M/UL — SIGNIFICANT CHANGE UP (ref 4.2–5.4)
RBC # FLD: 12.9 % — SIGNIFICANT CHANGE UP (ref 11.5–14.5)
SODIUM SERPL-SCNC: 140 MMOL/L — SIGNIFICANT CHANGE UP (ref 135–146)
WBC # BLD: 8.98 K/UL — SIGNIFICANT CHANGE UP (ref 4.8–10.8)
WBC # FLD AUTO: 8.98 K/UL — SIGNIFICANT CHANGE UP (ref 4.8–10.8)

## 2025-07-18 PROCEDURE — 85025 COMPLETE CBC W/AUTO DIFF WBC: CPT

## 2025-07-18 PROCEDURE — 93005 ELECTROCARDIOGRAM TRACING: CPT

## 2025-07-18 PROCEDURE — 36415 COLL VENOUS BLD VENIPUNCTURE: CPT

## 2025-07-18 PROCEDURE — 93010 ELECTROCARDIOGRAM REPORT: CPT

## 2025-07-18 PROCEDURE — 80053 COMPREHEN METABOLIC PANEL: CPT

## 2025-07-18 PROCEDURE — 99214 OFFICE O/P EST MOD 30 MIN: CPT | Mod: 25

## 2025-07-18 NOTE — H&P PST ADULT - HISTORY OF PRESENT ILLNESS
65 yo Female with PMH of HTN, HLD, Right Breast Ductal Ca In situ ( 2012) S/P Chemo and radiation ( 2012) who presents to pretesting in preparations for the above surgery due to dysfunctional uterine bleedings and uterine polyp.   Patient denies any cp, sob, palpitations, fever, cough, URI, abdominal pains, N/V, UTI, Rashes or open wounds.  As per patient exercise tolerance of 2-3 fos walks with out sob  Today patient denies any signs and symptoms or exposure to covid.  Anesthesia Alert  NO--Difficult Airway  NO--History of neck surgery or radiation  NO--Limited ROM of neck  NO--History of Malignant hyperthermia  NO--No personal or family history of Pseudocholinesterase deficiency.  NO--Prior Anesthesia Complication  NO--Latex Allergy  NO--Loose teeth  NO--History of Rheumatoid Arthritis  NO--EVER  NO--Bleeding Risk  YES--Other: right arm precautions  Pt instructed to stop vitamins/supplements/herbal medications for one week prior to surgery  Endorses this is their full medical & surgical history including medications prescribed. Pt verbalized understanding of all pre-operative instructions and was given the opportunity to ask questions and have them answered. They were instructed to follow up with their surgeon/proceduralists office with any additional questions regarding their procedure.   Duke Activity Status Index (DASI) from [email or web address]  on 7/18/2025  ** All calculations should be rechecked by clinician prior to use **  RESULT SUMMARY:  50.7 points  The higher the score (maximum 58.2), the higher the functional status.  8.97 METs  INPUTS:  Take care of self —> 2.75 = Yes  Walk indoors —> 1.75 = Yes  Walk 1&ndash;2 blocks on level ground —> 2.75 = Yes  Climb a flight of stairs or walk up a hill —> 5.5 = Yes  Run a short distance —> 8 = Yes  Do light work around the house —> 2.7 = Yes  Do moderate work around the house —> 3.5 = Yes  Do heavy work around the house —> 8 = Yes  Do yardwork —> 4.5 = Yes  Have sexual relations —> 5.25 = Yes  Participate in moderate recreational activities —> 6 = Yes  Participate in strenuous sports —> 0 = No  Revised Cardiac Risk Index for Pre-Operative Risk from [email or web address]  on 7/18/2025  ** All calculations should be rechecked by clinician prior to use **  RESULT SUMMARY:  0 points  RCRI Score  0.5 %  Risk of major cardiac event  INPUTS:  High-risk surgery —> 0 = No  History of ischemic heart disease —> 0 = No  History of congestive heart failure —> 0 = No  History of cerebrovascular disease —> 0 = No  Pre-operative treatment with insulin —> 0 = No  Pre-operative creatinine >2 mg/dL / 176.8 µmol/L —> 0 = No

## 2025-07-18 NOTE — H&P PST ADULT - REASON FOR ADMISSION
Case Type: OP   Suite: [address]  Proceduralist: Cordelia Gentile  Confirmed Surgery Date Time: 08-  PAST Date Time: 07- - 17:00  Procedure: EXAM UNDER ANESTHESIA DILATION AND CURETTAGE HYSTEROSCOPY, POSSIBLE MYOSURE  Laterality: Bilateral  Length of Procedure: 60 Minutes  Anesthesia Type: General

## 2025-07-18 NOTE — H&P PST ADULT - ATTENDING COMMENTS
d &C hysteroscopy , possible myosure, eua - pmb, uterine polyp   risk of surgery including but not limited to infection, bleeding and injury d/w patient   consent obtained

## 2025-07-18 NOTE — H&P PST ADULT - PATIENT'S PREFERRED PRONOUN
Pt got the results from lab work and stated dr informed him he could start warfin if he wanted.  Pt wants to know if he can start that and if medication can be sent over to [facility] or does he need appt to get started on medication    Call pt at [phone] Her/She

## 2025-07-18 NOTE — H&P PST ADULT - NSICDXPASTMEDICALHX_GEN_ALL_CORE_FT
PAST MEDICAL HISTORY:  Breast CA     High cholesterol     History of chemotherapy     Kidney stone     S/P radiation therapy

## 2025-07-19 DIAGNOSIS — Z01.818 ENCOUNTER FOR OTHER PREPROCEDURAL EXAMINATION: ICD-10-CM

## 2025-07-19 DIAGNOSIS — D25.9 LEIOMYOMA OF UTERUS, UNSPECIFIED: ICD-10-CM

## 2025-08-01 ENCOUNTER — RESULT REVIEW (OUTPATIENT)
Age: 65
End: 2025-08-01

## 2025-08-01 ENCOUNTER — NON-APPOINTMENT (OUTPATIENT)
Age: 65
End: 2025-08-01

## 2025-08-01 ENCOUNTER — APPOINTMENT (OUTPATIENT)
Dept: OBGYN | Facility: HOSPITAL | Age: 65
End: 2025-08-01

## 2025-08-01 ENCOUNTER — TRANSCRIPTION ENCOUNTER (OUTPATIENT)
Age: 65
End: 2025-08-01

## 2025-08-01 ENCOUNTER — OUTPATIENT (OUTPATIENT)
Dept: OUTPATIENT SERVICES | Facility: HOSPITAL | Age: 65
LOS: 1 days | Discharge: HOME | End: 2025-08-01
Payer: COMMERCIAL

## 2025-08-01 VITALS
WEIGHT: 195.11 LBS | HEART RATE: 75 BPM | DIASTOLIC BLOOD PRESSURE: 82 MMHG | OXYGEN SATURATION: 98 % | HEIGHT: 61 IN | SYSTOLIC BLOOD PRESSURE: 133 MMHG | RESPIRATION RATE: 18 BRPM | TEMPERATURE: 98 F

## 2025-08-01 VITALS
TEMPERATURE: 98 F | HEART RATE: 56 BPM | SYSTOLIC BLOOD PRESSURE: 131 MMHG | DIASTOLIC BLOOD PRESSURE: 60 MMHG | RESPIRATION RATE: 16 BRPM

## 2025-08-01 DIAGNOSIS — D25.9 LEIOMYOMA OF UTERUS, UNSPECIFIED: ICD-10-CM

## 2025-08-01 DIAGNOSIS — Z98.890 OTHER SPECIFIED POSTPROCEDURAL STATES: Chronic | ICD-10-CM

## 2025-08-01 DIAGNOSIS — N95.0 POSTMENOPAUSAL BLEEDING: ICD-10-CM

## 2025-08-01 PROCEDURE — 88305 TISSUE EXAM BY PATHOLOGIST: CPT | Mod: 26

## 2025-08-01 PROCEDURE — 88305 TISSUE EXAM BY PATHOLOGIST: CPT

## 2025-08-01 PROCEDURE — 58558 HYSTEROSCOPY BIOPSY: CPT

## 2025-08-01 PROCEDURE — C1782: CPT

## 2025-08-01 RX ORDER — OXYCODONE HYDROCHLORIDE 30 MG/1
5 TABLET ORAL ONCE
Refills: 0 | Status: DISCONTINUED | OUTPATIENT
Start: 2025-08-01 | End: 2025-08-01

## 2025-08-01 RX ORDER — ONDANSETRON HCL/PF 4 MG/2 ML
4 VIAL (ML) INJECTION ONCE
Refills: 0 | Status: DISCONTINUED | OUTPATIENT
Start: 2025-08-01 | End: 2025-08-01

## 2025-08-01 RX ORDER — SODIUM CHLORIDE 9 G/1000ML
1000 INJECTION, SOLUTION INTRAVENOUS
Refills: 0 | Status: DISCONTINUED | OUTPATIENT
Start: 2025-08-01 | End: 2025-08-01

## 2025-08-01 RX ORDER — ACETAMINOPHEN 500 MG/5ML
1000 LIQUID (ML) ORAL ONCE
Refills: 0 | Status: COMPLETED | OUTPATIENT
Start: 2025-08-01 | End: 2025-08-01

## 2025-08-01 RX ORDER — HYDROMORPHONE/SOD CHLOR,ISO/PF 2 MG/10 ML
0.5 SYRINGE (ML) INJECTION
Refills: 0 | Status: DISCONTINUED | OUTPATIENT
Start: 2025-08-01 | End: 2025-08-01

## 2025-08-01 RX ADMIN — Medication 1000 MILLIGRAM(S): at 11:51

## 2025-08-04 LAB — SURGICAL PATHOLOGY STUDY: SIGNIFICANT CHANGE UP

## 2025-08-06 DIAGNOSIS — N95.0 POSTMENOPAUSAL BLEEDING: ICD-10-CM

## 2025-08-06 DIAGNOSIS — N84.0 POLYP OF CORPUS UTERI: ICD-10-CM

## 2025-08-06 DIAGNOSIS — Z92.21 PERSONAL HISTORY OF ANTINEOPLASTIC CHEMOTHERAPY: ICD-10-CM

## 2025-08-06 DIAGNOSIS — Z85.3 PERSONAL HISTORY OF MALIGNANT NEOPLASM OF BREAST: ICD-10-CM

## 2025-08-06 DIAGNOSIS — Z92.3 PERSONAL HISTORY OF IRRADIATION: ICD-10-CM

## 2025-08-06 DIAGNOSIS — E78.00 PURE HYPERCHOLESTEROLEMIA, UNSPECIFIED: ICD-10-CM

## 2025-08-06 PROBLEM — N20.0 CALCULUS OF KIDNEY: Chronic | Status: ACTIVE | Noted: 2025-07-18

## 2025-08-14 ENCOUNTER — APPOINTMENT (OUTPATIENT)
Dept: OBGYN | Facility: CLINIC | Age: 65
End: 2025-08-14
Payer: COMMERCIAL

## 2025-08-14 VITALS
SYSTOLIC BLOOD PRESSURE: 114 MMHG | HEIGHT: 61 IN | WEIGHT: 195 LBS | TEMPERATURE: 98.6 F | DIASTOLIC BLOOD PRESSURE: 79 MMHG | HEART RATE: 94 BPM | BODY MASS INDEX: 36.82 KG/M2

## 2025-08-14 PROCEDURE — 99213 OFFICE O/P EST LOW 20 MIN: CPT

## 2025-09-15 ENCOUNTER — RESULT REVIEW (OUTPATIENT)
Age: 65
End: 2025-09-15